# Patient Record
Sex: FEMALE | Race: WHITE | NOT HISPANIC OR LATINO | URBAN - METROPOLITAN AREA
[De-identification: names, ages, dates, MRNs, and addresses within clinical notes are randomized per-mention and may not be internally consistent; named-entity substitution may affect disease eponyms.]

---

## 2017-04-10 PROBLEM — Z00.00 ENCOUNTER FOR PREVENTIVE HEALTH EXAMINATION: Noted: 2017-04-10

## 2017-04-27 ENCOUNTER — OUTPATIENT (OUTPATIENT)
Dept: OUTPATIENT SERVICES | Facility: HOSPITAL | Age: 42
LOS: 1 days | End: 2017-04-27
Payer: COMMERCIAL

## 2017-04-27 ENCOUNTER — APPOINTMENT (OUTPATIENT)
Dept: MAMMOGRAPHY | Facility: CLINIC | Age: 42
End: 2017-04-27

## 2017-04-27 ENCOUNTER — APPOINTMENT (OUTPATIENT)
Dept: ULTRASOUND IMAGING | Facility: CLINIC | Age: 42
End: 2017-04-27

## 2017-04-27 DIAGNOSIS — Z12.39 ENCOUNTER FOR OTHER SCREENING FOR MALIGNANT NEOPLASM OF BREAST: ICD-10-CM

## 2017-04-27 DIAGNOSIS — Z00.8 ENCOUNTER FOR OTHER GENERAL EXAMINATION: ICD-10-CM

## 2017-04-27 PROCEDURE — 77067 SCR MAMMO BI INCL CAD: CPT

## 2017-04-27 PROCEDURE — 77063 BREAST TOMOSYNTHESIS BI: CPT

## 2017-04-27 PROCEDURE — 76641 ULTRASOUND BREAST COMPLETE: CPT

## 2018-05-22 ENCOUNTER — RESULT REVIEW (OUTPATIENT)
Age: 43
End: 2018-05-22

## 2018-06-20 ENCOUNTER — OUTPATIENT (OUTPATIENT)
Dept: OUTPATIENT SERVICES | Facility: HOSPITAL | Age: 43
LOS: 1 days | End: 2018-06-20
Payer: COMMERCIAL

## 2018-06-20 ENCOUNTER — APPOINTMENT (OUTPATIENT)
Dept: ULTRASOUND IMAGING | Facility: CLINIC | Age: 43
End: 2018-06-20
Payer: COMMERCIAL

## 2018-06-20 ENCOUNTER — APPOINTMENT (OUTPATIENT)
Dept: MAMMOGRAPHY | Facility: CLINIC | Age: 43
End: 2018-06-20
Payer: COMMERCIAL

## 2018-06-20 DIAGNOSIS — Z12.39 ENCOUNTER FOR OTHER SCREENING FOR MALIGNANT NEOPLASM OF BREAST: ICD-10-CM

## 2018-06-20 PROCEDURE — 77067 SCR MAMMO BI INCL CAD: CPT | Mod: 26

## 2018-06-20 PROCEDURE — 77067 SCR MAMMO BI INCL CAD: CPT

## 2018-06-20 PROCEDURE — 76641 ULTRASOUND BREAST COMPLETE: CPT | Mod: 26,50

## 2018-06-20 PROCEDURE — 77063 BREAST TOMOSYNTHESIS BI: CPT | Mod: 26

## 2018-06-20 PROCEDURE — 76641 ULTRASOUND BREAST COMPLETE: CPT

## 2018-06-20 PROCEDURE — 77063 BREAST TOMOSYNTHESIS BI: CPT

## 2019-01-03 ENCOUNTER — TRANSCRIPTION ENCOUNTER (OUTPATIENT)
Age: 44
End: 2019-01-03

## 2019-01-03 ENCOUNTER — APPOINTMENT (OUTPATIENT)
Dept: ULTRASOUND IMAGING | Facility: CLINIC | Age: 44
End: 2019-01-03
Payer: COMMERCIAL

## 2019-01-03 ENCOUNTER — OUTPATIENT (OUTPATIENT)
Dept: OUTPATIENT SERVICES | Facility: HOSPITAL | Age: 44
LOS: 1 days | End: 2019-01-03
Payer: COMMERCIAL

## 2019-01-03 DIAGNOSIS — Z00.8 ENCOUNTER FOR OTHER GENERAL EXAMINATION: ICD-10-CM

## 2019-01-03 DIAGNOSIS — N60.19 DIFFUSE CYSTIC MASTOPATHY OF UNSPECIFIED BREAST: ICD-10-CM

## 2019-01-03 PROCEDURE — 76642 ULTRASOUND BREAST LIMITED: CPT | Mod: 26,50

## 2019-01-03 PROCEDURE — 76642 ULTRASOUND BREAST LIMITED: CPT

## 2019-06-07 ENCOUNTER — RESULT REVIEW (OUTPATIENT)
Age: 44
End: 2019-06-07

## 2019-06-21 ENCOUNTER — OUTPATIENT (OUTPATIENT)
Dept: OUTPATIENT SERVICES | Facility: HOSPITAL | Age: 44
LOS: 1 days | End: 2019-06-21
Payer: COMMERCIAL

## 2019-06-21 ENCOUNTER — APPOINTMENT (OUTPATIENT)
Dept: ULTRASOUND IMAGING | Facility: CLINIC | Age: 44
End: 2019-06-21
Payer: COMMERCIAL

## 2019-06-21 ENCOUNTER — APPOINTMENT (OUTPATIENT)
Dept: MAMMOGRAPHY | Facility: CLINIC | Age: 44
End: 2019-06-21
Payer: COMMERCIAL

## 2019-06-21 DIAGNOSIS — Z12.31 ENCOUNTER FOR SCREENING MAMMOGRAM FOR MALIGNANT NEOPLASM OF BREAST: ICD-10-CM

## 2019-06-21 PROCEDURE — 77063 BREAST TOMOSYNTHESIS BI: CPT | Mod: 26

## 2019-06-21 PROCEDURE — 77067 SCR MAMMO BI INCL CAD: CPT | Mod: 26

## 2019-06-21 PROCEDURE — 77063 BREAST TOMOSYNTHESIS BI: CPT

## 2019-06-21 PROCEDURE — 76641 ULTRASOUND BREAST COMPLETE: CPT

## 2019-06-21 PROCEDURE — 77067 SCR MAMMO BI INCL CAD: CPT

## 2019-06-21 PROCEDURE — 76641 ULTRASOUND BREAST COMPLETE: CPT | Mod: 26,50

## 2019-07-23 ENCOUNTER — TRANSCRIPTION ENCOUNTER (OUTPATIENT)
Age: 44
End: 2019-07-23

## 2020-07-06 ENCOUNTER — RESULT REVIEW (OUTPATIENT)
Age: 45
End: 2020-07-06

## 2020-07-24 ENCOUNTER — TRANSCRIPTION ENCOUNTER (OUTPATIENT)
Age: 45
End: 2020-07-24

## 2020-08-26 ENCOUNTER — OUTPATIENT (OUTPATIENT)
Dept: OUTPATIENT SERVICES | Facility: HOSPITAL | Age: 45
LOS: 1 days | End: 2020-08-26
Payer: COMMERCIAL

## 2020-08-26 ENCOUNTER — APPOINTMENT (OUTPATIENT)
Dept: ULTRASOUND IMAGING | Facility: CLINIC | Age: 45
End: 2020-08-26
Payer: COMMERCIAL

## 2020-08-26 ENCOUNTER — APPOINTMENT (OUTPATIENT)
Dept: MAMMOGRAPHY | Facility: CLINIC | Age: 45
End: 2020-08-26
Payer: COMMERCIAL

## 2020-08-26 DIAGNOSIS — Z12.39 ENCOUNTER FOR OTHER SCREENING FOR MALIGNANT NEOPLASM OF BREAST: ICD-10-CM

## 2020-08-26 PROCEDURE — 77063 BREAST TOMOSYNTHESIS BI: CPT

## 2020-08-26 PROCEDURE — 77063 BREAST TOMOSYNTHESIS BI: CPT | Mod: 26

## 2020-08-26 PROCEDURE — 76641 ULTRASOUND BREAST COMPLETE: CPT | Mod: 26,50

## 2020-08-26 PROCEDURE — 77067 SCR MAMMO BI INCL CAD: CPT | Mod: 26

## 2020-08-26 PROCEDURE — 77067 SCR MAMMO BI INCL CAD: CPT

## 2020-08-26 PROCEDURE — 76641 ULTRASOUND BREAST COMPLETE: CPT

## 2021-07-14 ENCOUNTER — RESULT REVIEW (OUTPATIENT)
Age: 46
End: 2021-07-14

## 2021-08-17 ENCOUNTER — OUTPATIENT (OUTPATIENT)
Dept: OUTPATIENT SERVICES | Facility: HOSPITAL | Age: 46
LOS: 1 days | End: 2021-08-17
Payer: COMMERCIAL

## 2021-08-17 ENCOUNTER — APPOINTMENT (OUTPATIENT)
Dept: MAMMOGRAPHY | Facility: CLINIC | Age: 46
End: 2021-08-17
Payer: COMMERCIAL

## 2021-08-17 ENCOUNTER — APPOINTMENT (OUTPATIENT)
Dept: ULTRASOUND IMAGING | Facility: CLINIC | Age: 46
End: 2021-08-17
Payer: COMMERCIAL

## 2021-08-17 DIAGNOSIS — Z00.8 ENCOUNTER FOR OTHER GENERAL EXAMINATION: ICD-10-CM

## 2021-08-17 PROCEDURE — 76641 ULTRASOUND BREAST COMPLETE: CPT | Mod: 26,50

## 2021-08-17 PROCEDURE — 76641 ULTRASOUND BREAST COMPLETE: CPT

## 2021-08-17 PROCEDURE — 77063 BREAST TOMOSYNTHESIS BI: CPT

## 2021-08-17 PROCEDURE — 77063 BREAST TOMOSYNTHESIS BI: CPT | Mod: 26

## 2021-08-17 PROCEDURE — 77067 SCR MAMMO BI INCL CAD: CPT

## 2021-08-17 PROCEDURE — 77067 SCR MAMMO BI INCL CAD: CPT | Mod: 26

## 2021-08-20 ENCOUNTER — APPOINTMENT (OUTPATIENT)
Dept: ULTRASOUND IMAGING | Facility: CLINIC | Age: 46
End: 2021-08-20
Payer: COMMERCIAL

## 2021-08-20 ENCOUNTER — OUTPATIENT (OUTPATIENT)
Dept: OUTPATIENT SERVICES | Facility: HOSPITAL | Age: 46
LOS: 1 days | End: 2021-08-20
Payer: COMMERCIAL

## 2021-08-20 DIAGNOSIS — R92.8 OTHER ABNORMAL AND INCONCLUSIVE FINDINGS ON DIAGNOSTIC IMAGING OF BREAST: ICD-10-CM

## 2021-08-20 PROCEDURE — 76642 ULTRASOUND BREAST LIMITED: CPT | Mod: 26,LT

## 2021-08-20 PROCEDURE — 76642 ULTRASOUND BREAST LIMITED: CPT

## 2021-08-27 ENCOUNTER — APPOINTMENT (OUTPATIENT)
Dept: ULTRASOUND IMAGING | Facility: CLINIC | Age: 46
End: 2021-08-27
Payer: COMMERCIAL

## 2021-08-27 ENCOUNTER — OUTPATIENT (OUTPATIENT)
Dept: OUTPATIENT SERVICES | Facility: HOSPITAL | Age: 46
LOS: 1 days | End: 2021-08-27
Payer: COMMERCIAL

## 2021-08-27 ENCOUNTER — RESULT REVIEW (OUTPATIENT)
Age: 46
End: 2021-08-27

## 2021-08-27 DIAGNOSIS — Z00.8 ENCOUNTER FOR OTHER GENERAL EXAMINATION: ICD-10-CM

## 2021-08-27 DIAGNOSIS — R92.8 OTHER ABNORMAL AND INCONCLUSIVE FINDINGS ON DIAGNOSTIC IMAGING OF BREAST: ICD-10-CM

## 2021-08-27 PROCEDURE — 88305 TISSUE EXAM BY PATHOLOGIST: CPT

## 2021-08-27 PROCEDURE — 77065 DX MAMMO INCL CAD UNI: CPT | Mod: 26,LT

## 2021-08-27 PROCEDURE — 88360 TUMOR IMMUNOHISTOCHEM/MANUAL: CPT

## 2021-08-27 PROCEDURE — 88342 IMHCHEM/IMCYTCHM 1ST ANTB: CPT | Mod: 26,59

## 2021-08-27 PROCEDURE — 19083 BX BREAST 1ST LESION US IMAG: CPT | Mod: LT

## 2021-08-27 PROCEDURE — 19083 BX BREAST 1ST LESION US IMAG: CPT

## 2021-08-27 PROCEDURE — 88341 IMHCHEM/IMCYTCHM EA ADD ANTB: CPT

## 2021-08-27 PROCEDURE — 88305 TISSUE EXAM BY PATHOLOGIST: CPT | Mod: 26

## 2021-08-27 PROCEDURE — 88360 TUMOR IMMUNOHISTOCHEM/MANUAL: CPT | Mod: 26

## 2021-08-27 PROCEDURE — 77065 DX MAMMO INCL CAD UNI: CPT

## 2021-09-07 ENCOUNTER — APPOINTMENT (OUTPATIENT)
Dept: SURGICAL ONCOLOGY | Facility: CLINIC | Age: 46
End: 2021-09-07
Payer: COMMERCIAL

## 2021-09-07 VITALS
HEART RATE: 77 BPM | WEIGHT: 147 LBS | BODY MASS INDEX: 26.05 KG/M2 | HEIGHT: 63 IN | DIASTOLIC BLOOD PRESSURE: 88 MMHG | OXYGEN SATURATION: 96 % | SYSTOLIC BLOOD PRESSURE: 137 MMHG

## 2021-09-07 DIAGNOSIS — Z86.59 PERSONAL HISTORY OF OTHER MENTAL AND BEHAVIORAL DISORDERS: ICD-10-CM

## 2021-09-07 DIAGNOSIS — Z87.891 PERSONAL HISTORY OF NICOTINE DEPENDENCE: ICD-10-CM

## 2021-09-07 DIAGNOSIS — Z81.8 FAMILY HISTORY OF OTHER MENTAL AND BEHAVIORAL DISORDERS: ICD-10-CM

## 2021-09-07 DIAGNOSIS — Z82.49 FAMILY HISTORY OF ISCHEMIC HEART DISEASE AND OTHER DISEASES OF THE CIRCULATORY SYSTEM: ICD-10-CM

## 2021-09-07 DIAGNOSIS — Z78.9 OTHER SPECIFIED HEALTH STATUS: ICD-10-CM

## 2021-09-07 DIAGNOSIS — N95.9 UNSPECIFIED MENOPAUSAL AND PERIMENOPAUSAL DISORDER: ICD-10-CM

## 2021-09-07 DIAGNOSIS — Z83.438 FAMILY HISTORY OF OTHER DISORDER OF LIPOPROTEIN METABOLISM AND OTHER LIPIDEMIA: ICD-10-CM

## 2021-09-07 DIAGNOSIS — Z86.2 PERSONAL HISTORY OF DISEASES OF THE BLOOD AND BLOOD-FORMING ORGANS AND CERTAIN DISORDERS INVOLVING THE IMMUNE MECHANISM: ICD-10-CM

## 2021-09-07 PROCEDURE — 99245 OFF/OP CONSLTJ NEW/EST HI 55: CPT

## 2021-09-15 ENCOUNTER — OUTPATIENT (OUTPATIENT)
Dept: OUTPATIENT SERVICES | Facility: HOSPITAL | Age: 46
LOS: 1 days | End: 2021-09-15
Payer: COMMERCIAL

## 2021-09-15 ENCOUNTER — APPOINTMENT (OUTPATIENT)
Dept: ULTRASOUND IMAGING | Facility: IMAGING CENTER | Age: 46
End: 2021-09-15
Payer: COMMERCIAL

## 2021-09-15 ENCOUNTER — APPOINTMENT (OUTPATIENT)
Dept: MRI IMAGING | Facility: IMAGING CENTER | Age: 46
End: 2021-09-15
Payer: COMMERCIAL

## 2021-09-15 ENCOUNTER — RESULT REVIEW (OUTPATIENT)
Age: 46
End: 2021-09-15

## 2021-09-15 DIAGNOSIS — Z00.8 ENCOUNTER FOR OTHER GENERAL EXAMINATION: ICD-10-CM

## 2021-09-15 DIAGNOSIS — C50.912 MALIGNANT NEOPLASM OF UNSPECIFIED SITE OF LEFT FEMALE BREAST: ICD-10-CM

## 2021-09-15 PROCEDURE — 77049 MRI BREAST C-+ W/CAD BI: CPT | Mod: 26

## 2021-09-15 PROCEDURE — C8908: CPT

## 2021-09-15 PROCEDURE — 76830 TRANSVAGINAL US NON-OB: CPT | Mod: 26

## 2021-09-15 PROCEDURE — 76856 US EXAM PELVIC COMPLETE: CPT | Mod: 26

## 2021-09-15 PROCEDURE — C8937: CPT

## 2021-09-15 PROCEDURE — 76700 US EXAM ABDOM COMPLETE: CPT | Mod: 26

## 2021-09-15 PROCEDURE — 76700 US EXAM ABDOM COMPLETE: CPT

## 2021-09-15 PROCEDURE — 76830 TRANSVAGINAL US NON-OB: CPT

## 2021-09-15 PROCEDURE — 76856 US EXAM PELVIC COMPLETE: CPT

## 2021-09-15 PROCEDURE — A9585: CPT

## 2021-09-17 ENCOUNTER — OUTPATIENT (OUTPATIENT)
Dept: OUTPATIENT SERVICES | Facility: HOSPITAL | Age: 46
LOS: 1 days | End: 2021-09-17
Payer: COMMERCIAL

## 2021-09-17 ENCOUNTER — APPOINTMENT (OUTPATIENT)
Dept: NUCLEAR MEDICINE | Facility: IMAGING CENTER | Age: 46
End: 2021-09-17
Payer: COMMERCIAL

## 2021-09-17 ENCOUNTER — APPOINTMENT (OUTPATIENT)
Dept: RADIOLOGY | Facility: IMAGING CENTER | Age: 46
End: 2021-09-17
Payer: COMMERCIAL

## 2021-09-17 DIAGNOSIS — C50.912 MALIGNANT NEOPLASM OF UNSPECIFIED SITE OF LEFT FEMALE BREAST: ICD-10-CM

## 2021-09-17 DIAGNOSIS — Z00.8 ENCOUNTER FOR OTHER GENERAL EXAMINATION: ICD-10-CM

## 2021-09-17 PROCEDURE — 78306 BONE IMAGING WHOLE BODY: CPT | Mod: 26

## 2021-09-17 PROCEDURE — 71046 X-RAY EXAM CHEST 2 VIEWS: CPT | Mod: 26

## 2021-09-17 PROCEDURE — 71046 X-RAY EXAM CHEST 2 VIEWS: CPT

## 2021-09-17 PROCEDURE — A9561: CPT

## 2021-09-17 PROCEDURE — 78306 BONE IMAGING WHOLE BODY: CPT

## 2021-09-20 ENCOUNTER — TRANSCRIPTION ENCOUNTER (OUTPATIENT)
Age: 46
End: 2021-09-20

## 2021-09-22 ENCOUNTER — APPOINTMENT (OUTPATIENT)
Dept: MRI IMAGING | Facility: CLINIC | Age: 46
End: 2021-09-22

## 2021-09-30 ENCOUNTER — OUTPATIENT (OUTPATIENT)
Dept: OUTPATIENT SERVICES | Facility: HOSPITAL | Age: 46
LOS: 1 days | End: 2021-09-30
Payer: COMMERCIAL

## 2021-09-30 ENCOUNTER — RESULT REVIEW (OUTPATIENT)
Age: 46
End: 2021-09-30

## 2021-09-30 VITALS
HEIGHT: 63 IN | WEIGHT: 148.59 LBS | HEART RATE: 70 BPM | TEMPERATURE: 98 F | SYSTOLIC BLOOD PRESSURE: 120 MMHG | RESPIRATION RATE: 18 BRPM | OXYGEN SATURATION: 98 % | DIASTOLIC BLOOD PRESSURE: 79 MMHG

## 2021-09-30 DIAGNOSIS — C50.912 MALIGNANT NEOPLASM OF UNSPECIFIED SITE OF LEFT FEMALE BREAST: ICD-10-CM

## 2021-09-30 DIAGNOSIS — Z01.818 ENCOUNTER FOR OTHER PREPROCEDURAL EXAMINATION: ICD-10-CM

## 2021-09-30 DIAGNOSIS — F41.9 ANXIETY DISORDER, UNSPECIFIED: ICD-10-CM

## 2021-09-30 DIAGNOSIS — Z98.890 OTHER SPECIFIED POSTPROCEDURAL STATES: Chronic | ICD-10-CM

## 2021-09-30 DIAGNOSIS — N84.0 POLYP OF CORPUS UTERI: Chronic | ICD-10-CM

## 2021-09-30 DIAGNOSIS — Z98.49 CATARACT EXTRACTION STATUS, UNSPECIFIED EYE: Chronic | ICD-10-CM

## 2021-09-30 DIAGNOSIS — K21.9 GASTRO-ESOPHAGEAL REFLUX DISEASE WITHOUT ESOPHAGITIS: ICD-10-CM

## 2021-09-30 LAB
HCG SERPL-ACNC: <1 MIU/ML — SIGNIFICANT CHANGE UP
HCT VFR BLD CALC: 40.6 % — SIGNIFICANT CHANGE UP (ref 34.5–45)
HGB BLD-MCNC: 13.4 G/DL — SIGNIFICANT CHANGE UP (ref 11.5–15.5)
MCHC RBC-ENTMCNC: 31.1 PG — SIGNIFICANT CHANGE UP (ref 27–34)
MCHC RBC-ENTMCNC: 33 GM/DL — SIGNIFICANT CHANGE UP (ref 32–36)
MCV RBC AUTO: 94.2 FL — SIGNIFICANT CHANGE UP (ref 80–100)
NRBC # BLD: 0 /100 WBCS — SIGNIFICANT CHANGE UP (ref 0–0)
PLATELET # BLD AUTO: 281 K/UL — SIGNIFICANT CHANGE UP (ref 150–400)
RBC # BLD: 4.31 M/UL — SIGNIFICANT CHANGE UP (ref 3.8–5.2)
RBC # FLD: 12.2 % — SIGNIFICANT CHANGE UP (ref 10.3–14.5)
SURGICAL PATHOLOGY STUDY: SIGNIFICANT CHANGE UP
WBC # BLD: 10.33 K/UL — SIGNIFICANT CHANGE UP (ref 3.8–10.5)
WBC # FLD AUTO: 10.33 K/UL — SIGNIFICANT CHANGE UP (ref 3.8–10.5)

## 2021-09-30 PROCEDURE — G0463: CPT

## 2021-09-30 PROCEDURE — 85027 COMPLETE CBC AUTOMATED: CPT

## 2021-09-30 PROCEDURE — 36415 COLL VENOUS BLD VENIPUNCTURE: CPT

## 2021-09-30 PROCEDURE — 84702 CHORIONIC GONADOTROPIN TEST: CPT

## 2021-09-30 RX ORDER — SODIUM CHLORIDE 9 MG/ML
1000 INJECTION, SOLUTION INTRAVENOUS
Refills: 0 | Status: DISCONTINUED | OUTPATIENT
Start: 2021-10-06 | End: 2021-10-06

## 2021-09-30 NOTE — H&P PST ADULT - NSICDXFAMILYHX_GEN_ALL_CORE_FT
FAMILY HISTORY:  Father  Still living? Unknown  FH: hypertension, Age at diagnosis: Age Unknown    Mother  Still living? Unknown  FH: hyperlipidemia, Age at diagnosis: Age Unknown  FH: hypertension, Age at diagnosis: Age Unknown

## 2021-09-30 NOTE — H&P PST ADULT - HISTORY OF PRESENT ILLNESS
47 y/o female with PMH of GERD, anxiety, reactive airway disorder, and insomnia presents for PST.  Patient reports abnormal routine breast US in 8/2021.  Since has had additional work up including biopsy, MRI, and repeat US and was diagnoses with left breast malignant neoplasm.  Scheduled for excision left breast cancer w/wire mammographic localization left axillary sentinel lymph node biopsy with Dr San and Dr Martinez on 10/06/2021.  COVID-19 testing scheduled for 10/03/2021 at [facility].

## 2021-09-30 NOTE — H&P PST ADULT - NSICDXPASTMEDICALHX_GEN_ALL_CORE_FT
PAST MEDICAL HISTORY:  Anxiety     GERD (gastroesophageal reflux disease)     Reactive airway disease      PAST MEDICAL HISTORY:  Anxiety     GERD (gastroesophageal reflux disease)     Insomnia     Reactive airway disease

## 2021-09-30 NOTE — H&P PST ADULT - NSICDXPASTSURGICALHX_GEN_ALL_CORE_FT
PAST SURGICAL HISTORY:  H/O endoscopy     S/P cataract surgery     Uterine polyp polypectomy 2014

## 2021-09-30 NOTE — H&P PST ADULT - PROBLEM SELECTOR PLAN 1
Scheduled for excision left breast cancer w/wire mammographic localization, left axillary sentinel lymph node biopsy, injection in OR with Dr San and Dr Martinez on 10/06/2021.  Pre op instructions given and patient verbalized understanding.  CBC, HCG pending.  Chest x ray on chart.  COVID-19 testing scheduled and to follow precautions pre op.  NPO after midnight night before surgery.  May take medications with sip of water Am of procedure.  Chlorhexidine wash given with instructions.  To stop ASA, NSAIDs, vitamins and supplements starting today.

## 2021-09-30 NOTE — H&P PST ADULT - NSANTHOSAYNRD_GEN_A_CORE
neck 13 inches/No. DANNIE screening performed.  STOP BANG Legend: 0-2 = LOW Risk; 3-4 = INTERMEDIATE Risk; 5-8 = HIGH Risk

## 2021-09-30 NOTE — H&P PST ADULT - ATTENDING COMMENTS
46 y.o.  w/ Lt. br. ca., sched'd for wire-loc excis., SLNbx, an oncoplasty: Dr Martinez    D/W her pre-op an day of surgery.    All Q's A'd, consent on chart

## 2021-10-01 ENCOUNTER — APPOINTMENT (OUTPATIENT)
Dept: PLASTIC SURGERY | Facility: CLINIC | Age: 46
End: 2021-10-01
Payer: COMMERCIAL

## 2021-10-01 ENCOUNTER — TRANSCRIPTION ENCOUNTER (OUTPATIENT)
Age: 46
End: 2021-10-01

## 2021-10-01 PROCEDURE — 99244 OFF/OP CNSLTJ NEW/EST MOD 40: CPT

## 2021-10-03 ENCOUNTER — APPOINTMENT (OUTPATIENT)
Dept: DISASTER EMERGENCY | Facility: CLINIC | Age: 46
End: 2021-10-03

## 2021-10-04 LAB — SARS-COV-2 N GENE NPH QL NAA+PROBE: NOT DETECTED

## 2021-10-04 NOTE — CONSULT LETTER
[Dear  ___] : Dear  [unfilled], [Consult Letter:] : I had the pleasure of evaluating your patient, [unfilled]. [Please see my note below.] : Please see my note below. [Consult Closing:] : Thank you very much for allowing me to participate in the care of this patient.  If you have any questions, please do not hesitate to contact me. [Sincerely,] : Sincerely, [FreeTextEntry3] : Vijay Martinez MD, FACS

## 2021-10-05 ENCOUNTER — TRANSCRIPTION ENCOUNTER (OUTPATIENT)
Age: 46
End: 2021-10-05

## 2021-10-05 PROBLEM — F41.9 ANXIETY DISORDER, UNSPECIFIED: Chronic | Status: ACTIVE | Noted: 2021-09-30

## 2021-10-05 PROBLEM — J45.909 UNSPECIFIED ASTHMA, UNCOMPLICATED: Chronic | Status: ACTIVE | Noted: 2021-09-30

## 2021-10-05 PROBLEM — K21.9 GASTRO-ESOPHAGEAL REFLUX DISEASE WITHOUT ESOPHAGITIS: Chronic | Status: ACTIVE | Noted: 2021-09-30

## 2021-10-05 RX ORDER — HEPARIN SODIUM 5000 [USP'U]/ML
5000 INJECTION INTRAVENOUS; SUBCUTANEOUS ONCE
Refills: 0 | Status: COMPLETED | OUTPATIENT
Start: 2021-10-06 | End: 2021-10-06

## 2021-10-05 NOTE — ASU DISCHARGE PLAN (ADULT/PEDIATRIC) - COMMENTS
Please call for an appt if you don't have an appt already.   Dr. Martinez's  direct phone number is [phone]. If after office hours (9-5PM M-F), then please wait until normal business hours to make an appt.  You may leave a detailed VM as well.  You will see Dr. Martinez's GIOVANNI Kendall, (Cindy DAVILA) for your dressing change and first post operative visit.  You may also contact her via email: [email or web address] for any concerns or questions.

## 2021-10-05 NOTE — ASU DISCHARGE PLAN (ADULT/PEDIATRIC) - ACTIVITY LEVEL
No excercise/No heavy lifting/No sports/gym *Don't sleep on your left side./No excercise/No heavy lifting/No sports/gym/No tub baths/No intercourse

## 2021-10-05 NOTE — ASU DISCHARGE PLAN (ADULT/PEDIATRIC) - ASU DC SPECIAL INSTRUCTIONSFT
Initial followup with plastic surgery in the next 5-15 days, regarding matters of bandages, activities, and showering.    Dr. San should call with pathology report in approximately 2 weeks.  The conversation will determine further management. 1. Please follow up with Dr. Martinez's PA, Faiza, next week TUESDAY 10/12/21 for your first post operative visit.   Please call [phone] for an appointment time.    2. Please avoid any strenuous activities, AVOID bending, stretching, reaching overhead, or any heavy lifting. Please keep the bra intact as much as possible, only removing to shower.    3. Some OOZING and blood on the dressing is normal and to be expected. If it becomes saturated w/ BRIGHT red blood that does not stop, please call [phone] for email FAIZA: [email or web address]    4. Your medications were sent to your pharmacy.  Please take the prescribed medications as directed.   For MILD pain please take regular over the counter pain medications such as: Advil, Tylenol, Motrin.   Only take the narcotic prescribed pain medication for SEVERE PAIN.   If constipation occurs after taking narcotic pain medications, please take an over the counter stool softener.    5. Dr. San will call with pathology results in approximately 2 weeks, the conversation will determine further management and follow up.

## 2021-10-05 NOTE — ASU DISCHARGE PLAN (ADULT/PEDIATRIC) - BATHING
you may take a QUICK shower (<10 min) starting tomorrow. Try to minimize your time without the supportive bra in place./Shower only

## 2021-10-06 ENCOUNTER — RESULT REVIEW (OUTPATIENT)
Age: 46
End: 2021-10-06

## 2021-10-06 ENCOUNTER — APPOINTMENT (OUTPATIENT)
Dept: PLASTIC SURGERY | Facility: HOSPITAL | Age: 46
End: 2021-10-06
Payer: COMMERCIAL

## 2021-10-06 ENCOUNTER — APPOINTMENT (OUTPATIENT)
Dept: SURGICAL ONCOLOGY | Facility: HOSPITAL | Age: 46
End: 2021-10-06

## 2021-10-06 ENCOUNTER — OUTPATIENT (OUTPATIENT)
Dept: OUTPATIENT SERVICES | Facility: HOSPITAL | Age: 46
LOS: 1 days | End: 2021-10-06
Payer: COMMERCIAL

## 2021-10-06 VITALS
DIASTOLIC BLOOD PRESSURE: 80 MMHG | HEIGHT: 63 IN | RESPIRATION RATE: 16 BRPM | SYSTOLIC BLOOD PRESSURE: 119 MMHG | HEART RATE: 75 BPM | OXYGEN SATURATION: 97 % | TEMPERATURE: 98 F | WEIGHT: 148.59 LBS

## 2021-10-06 VITALS
RESPIRATION RATE: 16 BRPM | TEMPERATURE: 97 F | OXYGEN SATURATION: 97 % | HEART RATE: 75 BPM | SYSTOLIC BLOOD PRESSURE: 120 MMHG | DIASTOLIC BLOOD PRESSURE: 76 MMHG

## 2021-10-06 DIAGNOSIS — Z98.890 OTHER SPECIFIED POSTPROCEDURAL STATES: Chronic | ICD-10-CM

## 2021-10-06 DIAGNOSIS — C50.912 MALIGNANT NEOPLASM OF UNSPECIFIED SITE OF LEFT FEMALE BREAST: ICD-10-CM

## 2021-10-06 DIAGNOSIS — N84.0 POLYP OF CORPUS UTERI: Chronic | ICD-10-CM

## 2021-10-06 DIAGNOSIS — Z98.49 CATARACT EXTRACTION STATUS, UNSPECIFIED EYE: Chronic | ICD-10-CM

## 2021-10-06 PROCEDURE — 38792 RA TRACER ID OF SENTINL NODE: CPT

## 2021-10-06 PROCEDURE — 88307 TISSUE EXAM BY PATHOLOGIST: CPT | Mod: 26

## 2021-10-06 PROCEDURE — 19281 PERQ DEVICE BREAST 1ST IMAG: CPT

## 2021-10-06 PROCEDURE — 19499 UNLISTED PROCEDURE BREAST: CPT | Mod: AS

## 2021-10-06 PROCEDURE — 13101 CMPLX RPR TRUNK 2.6-7.5 CM: CPT | Mod: 59

## 2021-10-06 PROCEDURE — 88341 IMHCHEM/IMCYTCHM EA ADD ANTB: CPT | Mod: 26

## 2021-10-06 PROCEDURE — 19125 EXCISION BREAST LESION: CPT | Mod: LT

## 2021-10-06 PROCEDURE — 76098 X-RAY EXAM SURGICAL SPECIMEN: CPT | Mod: 26

## 2021-10-06 PROCEDURE — 88307 TISSUE EXAM BY PATHOLOGIST: CPT

## 2021-10-06 PROCEDURE — 38525 BIOPSY/REMOVAL LYMPH NODES: CPT | Mod: LT

## 2021-10-06 PROCEDURE — C1889: CPT

## 2021-10-06 PROCEDURE — 19281 PERQ DEVICE BREAST 1ST IMAG: CPT | Mod: LT

## 2021-10-06 PROCEDURE — 76098 X-RAY EXAM SURGICAL SPECIMEN: CPT

## 2021-10-06 PROCEDURE — 19301 PARTIAL MASTECTOMY: CPT | Mod: LT

## 2021-10-06 PROCEDURE — 38740 REMOVE ARMPIT LYMPH NODES: CPT | Mod: LT

## 2021-10-06 PROCEDURE — 19499 UNLISTED PROCEDURE BREAST: CPT

## 2021-10-06 PROCEDURE — 38900 IO MAP OF SENT LYMPH NODE: CPT | Mod: LT

## 2021-10-06 PROCEDURE — 13101 CMPLX RPR TRUNK 2.6-7.5 CM: CPT | Mod: AS,59

## 2021-10-06 PROCEDURE — 88342 IMHCHEM/IMCYTCHM 1ST ANTB: CPT

## 2021-10-06 PROCEDURE — A9541: CPT

## 2021-10-06 PROCEDURE — 88342 IMHCHEM/IMCYTCHM 1ST ANTB: CPT | Mod: 26

## 2021-10-06 PROCEDURE — 88341 IMHCHEM/IMCYTCHM EA ADD ANTB: CPT

## 2021-10-06 PROCEDURE — 38740 REMOVE ARMPIT LYMPH NODES: CPT | Mod: AS,LT

## 2021-10-06 RX ORDER — ALPRAZOLAM 0.25 MG
1 TABLET ORAL
Qty: 0 | Refills: 0 | DISCHARGE

## 2021-10-06 RX ORDER — OXYCODONE AND ACETAMINOPHEN 5; 325 MG/1; MG/1
1 TABLET ORAL ONCE
Refills: 0 | Status: DISCONTINUED | OUTPATIENT
Start: 2021-10-06 | End: 2021-10-06

## 2021-10-06 RX ORDER — CLONAZEPAM 1 MG
1 TABLET ORAL
Qty: 0 | Refills: 0 | DISCHARGE

## 2021-10-06 RX ORDER — HYDROMORPHONE HYDROCHLORIDE 2 MG/ML
0.5 INJECTION INTRAMUSCULAR; INTRAVENOUS; SUBCUTANEOUS
Refills: 0 | Status: DISCONTINUED | OUTPATIENT
Start: 2021-10-06 | End: 2021-10-06

## 2021-10-06 RX ORDER — SODIUM CHLORIDE 9 MG/ML
1000 INJECTION, SOLUTION INTRAVENOUS
Refills: 0 | Status: DISCONTINUED | OUTPATIENT
Start: 2021-10-06 | End: 2021-10-06

## 2021-10-06 RX ORDER — ALBUTEROL 90 UG/1
2 AEROSOL, METERED ORAL
Qty: 0 | Refills: 0 | DISCHARGE

## 2021-10-06 RX ORDER — ONDANSETRON 8 MG/1
4 TABLET, FILM COATED ORAL ONCE
Refills: 0 | Status: DISCONTINUED | OUTPATIENT
Start: 2021-10-06 | End: 2021-10-06

## 2021-10-06 RX ORDER — LANOLIN ALCOHOL/MO/W.PET/CERES
1 CREAM (GRAM) TOPICAL
Qty: 0 | Refills: 0 | DISCHARGE

## 2021-10-06 RX ORDER — SODIUM CHLORIDE 9 MG/ML
1000 INJECTION, SOLUTION INTRAVENOUS
Refills: 0 | Status: DISCONTINUED | OUTPATIENT
Start: 2021-10-06 | End: 2021-10-21

## 2021-10-06 RX ORDER — IBUPROFEN 200 MG
3 TABLET ORAL
Qty: 0 | Refills: 0 | DISCHARGE

## 2021-10-06 RX ORDER — PANTOPRAZOLE SODIUM 20 MG/1
1 TABLET, DELAYED RELEASE ORAL
Qty: 0 | Refills: 0 | DISCHARGE

## 2021-10-06 RX ADMIN — HEPARIN SODIUM 5000 UNIT(S): 5000 INJECTION INTRAVENOUS; SUBCUTANEOUS at 16:05

## 2021-10-06 RX ADMIN — OXYCODONE AND ACETAMINOPHEN 1 TABLET(S): 5; 325 TABLET ORAL at 18:48

## 2021-10-06 RX ADMIN — OXYCODONE AND ACETAMINOPHEN 1 TABLET(S): 5; 325 TABLET ORAL at 19:15

## 2021-10-06 RX ADMIN — SODIUM CHLORIDE 50 MILLILITER(S): 9 INJECTION, SOLUTION INTRAVENOUS at 13:54

## 2021-10-06 NOTE — ASU PATIENT PROFILE, ADULT - NSICDXPASTMEDICALHX_GEN_ALL_CORE_FT
PAST MEDICAL HISTORY:  Anxiety     GERD (gastroesophageal reflux disease)     Insomnia     Reactive airway disease      PAST MEDICAL HISTORY:  Anxiety     GERD (gastroesophageal reflux disease)     Insomnia occasionally    Reactive airway disease

## 2021-10-06 NOTE — ASU PATIENT PROFILE, ADULT - VISION (WITH CORRECTIVE LENSES IF THE PATIENT USUALLY WEARS THEM):
Partially impaired: cannot see medication labels or newsprint, but can see obstacles in path, and the surrounding layout; can count fingers at arm's length reading glasses/Normal vision: sees adequately in most situations; can see medication labels, newsprint

## 2021-10-06 NOTE — BRIEF OPERATIVE NOTE - OPERATION/FINDINGS
I was present for the entire length of the case, there was no other qualified resident to assist  I assisted with hemostasis, exposure, creating of flaps, closure of wound, and placement of dressings. I was present for the entire length of the case, there was no other qualified resident to assist  I assisted with hemostasis, exposure, creating of flaps, closure of wound, and placement of dressings.  left breast oncoplastic reconstruction  complex closure of left axilla  prineo dermabnd was placed on all incisions

## 2021-10-07 PROBLEM — G47.00 INSOMNIA, UNSPECIFIED: Chronic | Status: ACTIVE | Noted: 2021-09-30

## 2021-10-08 ENCOUNTER — NON-APPOINTMENT (OUTPATIENT)
Age: 46
End: 2021-10-08

## 2021-10-12 ENCOUNTER — APPOINTMENT (OUTPATIENT)
Dept: PLASTIC SURGERY | Facility: CLINIC | Age: 46
End: 2021-10-12
Payer: COMMERCIAL

## 2021-10-12 VITALS — HEIGHT: 63 IN | WEIGHT: 148 LBS | BODY MASS INDEX: 26.22 KG/M2

## 2021-10-12 LAB — SURGICAL PATHOLOGY STUDY: SIGNIFICANT CHANGE UP

## 2021-10-12 PROCEDURE — 99212 OFFICE O/P EST SF 10 MIN: CPT

## 2021-10-18 NOTE — ASSESSMENT
[FreeTextEntry1] : \par \par \par We discussed her recently diagnosed left breast cancer.\par \par For her preoperative assessment I have suggested:\par Breast MRI.\par Chest x-ray.\par Sonogram of the abdomen and pelvis.\par Bone scan.\par \par I introduced the topic of genetic testing.\par Presently she is NOT interested in pursuing this line of investigation\par \par I presented the options of breast conservation versus mastectomy.\par Either approach would include sentinel node biopsy.\par \par Because she is concerned about even take time off from work, and telling people at work about her diagnosis and treatment, the breast conservation therapy approach may minimize her need to be absent from work, and therefore the need to share her personal information with others.\par She understands that she can have completion mastectomies for peace of mind at a time which is more convenient.\par She is a schoolteacher.\par \par Reviewed in detail, all questions answered.\par \par Note dictated\par \par \par 9/29/2021:\par Summary note.\par We spoke.\par I reviewed her preoperative imaging which is all favorable:\par Chest x-ray: Normal.\par Abdominal sonogram: Normal.\par Pelvic ultrasound: Normal.\par Bone scan: No evidence of metastatic disease.\par Breast MRI: BI-RADS 6, no malignancy corresponds to a 4 mm enhancing focus.\par \par Her breast conserving operation is scheduled for October 6\par \par \par 10/18/2021.\par We spoke.\par October 6, 2021, she had left breast conserving surgery.\par Oncoplastic closure: Dr. Vijay Martinez.\par Surgical pathology:\par >6 foci of invasive lobular carcinoma, largest measuring 4.5 mm.\par Negative margins.\par 0/2 SLN's on histology and immunohistochemistry.\par \par Ghazal will submit Oncotype DX.\par \par I have contacted our nurse navigators to arrange for a medical oncology and radiation therapy consultation.\par \par Referring physician contacted through secure email.\par \par My office will call to schedule a postoperative visit.

## 2021-10-18 NOTE — PHYSICAL EXAM
[Normal] : supple, no neck mass and thyroid not enlarged [Normal Neck Lymph Nodes] : normal neck lymph nodes  [Normal Supraclavicular Lymph Nodes] : normal supraclavicular lymph nodes [Normal Axillary Lymph Nodes] : normal axillary lymph nodes [Normal] : normal appearance, no rash, nodules, vesicles, ulcers, erythema [de-identified] : Groins not examined [de-identified] : Below

## 2021-10-18 NOTE — HISTORY OF PRESENT ILLNESS
[de-identified] : 45-year-old lady referred by her gynecologist (Dr. Yvonne Kurtz) with a recently diagnosed LEFT BREAST (5:00) CANCER.\par \par ER/VA +.\par HER-2 negative.\par \par This was an asymptomatic, nonpalpable, abnormality identified on annual breast imaging at [place].\par \par + Prior personal history.\par Bilateral breast cyst aspirations: Benign.\par No other procedures related to either breast.\par \par + FH:\par Paternal grandmother had breast cancer in her mid 40s.\par She also had colorectal cancer.\par \par No other relatives with breast cancer.\par No relatives with ovarian cancer.\par \par + Ashkenazi.\par \par Other relatives with a history of malignancy:\par Paternal grandmother: CRC.\par She also had breast cancer (above).\par \par Maternal grandmother: Glioblastoma multiforme.\par Maternal great grandmother: Multiple myeloma.\par \par \par Tyrer-Cuzick risk score =10.2%.\par \par \par 21:\par Annual, bilateral, mammogram and sonogram at [place]: BI-RADS 0.\par New, left breast (5:00) heterogeneous mass.\par \par 2021:\par Targeted left breast ultrasound at [place]: BI-RADS 4.\par 3 x 4 x 3 mm, slightly vertically oriented, new, nodule.\par Core biopsy recommended.\par \par 2021:\par Core biopsy at [place] provided the above diagnosis.\par \par \par Reproductive history:\par Menarche at 13.\par  2, para 1 at 40.\par She had not having regular menstrual periods due to her Mirena IUD; this was removed today.\par \par \par PMD: Dr. Sinan DE LEON.\par \par No pacemaker or defibrillator.\par No anticoagulants.\par \par Since the diagnosis of breast cancer, she has been prescribed just (below\par \par + GERD, diagnosed by EGD by Dr Justyna TRIVEDI.\par She takes pantoprazole.\par \par \par GYN: Dr. Yvonne Kurtz.\par She had her Mirena IUD removed earlier today.\par \par \par She has not yet had baseline colonoscopy.

## 2021-10-18 NOTE — REASON FOR VISIT
[Initial Consultation] : an initial consultation for [Other: _____] : [unfilled] [FreeTextEntry2] : Recently diagnosed left breast (5:00) breast cancer.

## 2021-10-19 ENCOUNTER — NON-APPOINTMENT (OUTPATIENT)
Age: 46
End: 2021-10-19

## 2021-10-19 ENCOUNTER — APPOINTMENT (OUTPATIENT)
Dept: PLASTIC SURGERY | Facility: CLINIC | Age: 46
End: 2021-10-19
Payer: COMMERCIAL

## 2021-10-19 VITALS
TEMPERATURE: 97.9 F | OXYGEN SATURATION: 97 % | SYSTOLIC BLOOD PRESSURE: 112 MMHG | HEART RATE: 93 BPM | WEIGHT: 148 LBS | DIASTOLIC BLOOD PRESSURE: 74 MMHG | BODY MASS INDEX: 26.22 KG/M2 | HEIGHT: 63 IN

## 2021-10-19 PROCEDURE — 99212 OFFICE O/P EST SF 10 MIN: CPT

## 2021-10-22 ENCOUNTER — RESULT REVIEW (OUTPATIENT)
Age: 46
End: 2021-10-22

## 2021-10-27 ENCOUNTER — TRANSCRIPTION ENCOUNTER (OUTPATIENT)
Age: 46
End: 2021-10-27

## 2021-10-28 ENCOUNTER — TRANSCRIPTION ENCOUNTER (OUTPATIENT)
Age: 46
End: 2021-10-28

## 2021-11-02 ENCOUNTER — OUTPATIENT (OUTPATIENT)
Dept: OUTPATIENT SERVICES | Facility: HOSPITAL | Age: 46
LOS: 1 days | Discharge: HOME | End: 2021-11-02
Payer: COMMERCIAL

## 2021-11-02 ENCOUNTER — APPOINTMENT (OUTPATIENT)
Dept: RADIATION ONCOLOGY | Facility: CLINIC | Age: 46
End: 2021-11-02
Payer: COMMERCIAL

## 2021-11-02 VITALS
SYSTOLIC BLOOD PRESSURE: 125 MMHG | HEART RATE: 79 BPM | RESPIRATION RATE: 17 BRPM | WEIGHT: 149.58 LBS | DIASTOLIC BLOOD PRESSURE: 82 MMHG | OXYGEN SATURATION: 100 % | BODY MASS INDEX: 26.5 KG/M2

## 2021-11-02 DIAGNOSIS — Z98.890 OTHER SPECIFIED POSTPROCEDURAL STATES: Chronic | ICD-10-CM

## 2021-11-02 DIAGNOSIS — N84.0 POLYP OF CORPUS UTERI: Chronic | ICD-10-CM

## 2021-11-02 DIAGNOSIS — Z98.49 CATARACT EXTRACTION STATUS, UNSPECIFIED EYE: Chronic | ICD-10-CM

## 2021-11-02 PROCEDURE — 99204 OFFICE O/P NEW MOD 45 MIN: CPT | Mod: GC,25

## 2021-11-02 PROCEDURE — 77263 THER RADIOLOGY TX PLNG CPLX: CPT

## 2021-11-03 ENCOUNTER — NON-APPOINTMENT (OUTPATIENT)
Age: 46
End: 2021-11-03

## 2021-11-04 ENCOUNTER — APPOINTMENT (OUTPATIENT)
Dept: PLASTIC SURGERY | Facility: CLINIC | Age: 46
End: 2021-11-04
Payer: COMMERCIAL

## 2021-11-04 VITALS
HEIGHT: 63 IN | WEIGHT: 148 LBS | HEART RATE: 85 BPM | TEMPERATURE: 98 F | OXYGEN SATURATION: 98 % | BODY MASS INDEX: 26.22 KG/M2

## 2021-11-04 DIAGNOSIS — R21 RASH AND OTHER NONSPECIFIC SKIN ERUPTION: ICD-10-CM

## 2021-11-04 PROCEDURE — 99212 OFFICE O/P EST SF 10 MIN: CPT

## 2021-11-04 RX ORDER — TRIAMCINOLONE ACETONIDE 0.25 MG/G
0.03 OINTMENT TOPICAL TWICE DAILY
Qty: 1 | Refills: 0 | Status: ACTIVE | COMMUNITY
Start: 2021-11-04 | End: 1900-01-01

## 2021-11-05 ENCOUNTER — OUTPATIENT (OUTPATIENT)
Dept: OUTPATIENT SERVICES | Facility: HOSPITAL | Age: 46
LOS: 1 days | Discharge: HOME | End: 2021-11-05

## 2021-11-05 ENCOUNTER — NON-APPOINTMENT (OUTPATIENT)
Age: 46
End: 2021-11-05

## 2021-11-05 DIAGNOSIS — N84.0 POLYP OF CORPUS UTERI: Chronic | ICD-10-CM

## 2021-11-05 DIAGNOSIS — Z98.49 CATARACT EXTRACTION STATUS, UNSPECIFIED EYE: Chronic | ICD-10-CM

## 2021-11-05 DIAGNOSIS — Z98.890 OTHER SPECIFIED POSTPROCEDURAL STATES: Chronic | ICD-10-CM

## 2021-11-05 DIAGNOSIS — C50.919 MALIGNANT NEOPLASM OF UNSPECIFIED SITE OF UNSPECIFIED FEMALE BREAST: ICD-10-CM

## 2021-11-08 ENCOUNTER — NON-APPOINTMENT (OUTPATIENT)
Age: 46
End: 2021-11-08

## 2021-11-08 ENCOUNTER — APPOINTMENT (OUTPATIENT)
Dept: HEMATOLOGY ONCOLOGY | Facility: CLINIC | Age: 46
End: 2021-11-08
Payer: COMMERCIAL

## 2021-11-08 VITALS
HEART RATE: 86 BPM | RESPIRATION RATE: 16 BRPM | DIASTOLIC BLOOD PRESSURE: 79 MMHG | SYSTOLIC BLOOD PRESSURE: 114 MMHG | HEIGHT: 62.6 IN | OXYGEN SATURATION: 98 % | BODY MASS INDEX: 26.95 KG/M2 | WEIGHT: 150.22 LBS | TEMPERATURE: 98.8 F

## 2021-11-08 DIAGNOSIS — Z80.3 FAMILY HISTORY OF MALIGNANT NEOPLASM OF BREAST: ICD-10-CM

## 2021-11-08 DIAGNOSIS — Z80.8 FAMILY HISTORY OF MALIGNANT NEOPLASM OF OTHER ORGANS OR SYSTEMS: ICD-10-CM

## 2021-11-08 PROCEDURE — 77333 RADIATION TREATMENT AID(S): CPT | Mod: 26

## 2021-11-08 PROCEDURE — 77290 THER RAD SIMULAJ FIELD CPLX: CPT | Mod: 26

## 2021-11-08 PROCEDURE — 99205 OFFICE O/P NEW HI 60 MIN: CPT

## 2021-11-08 RX ORDER — PANTOPRAZOLE SODIUM 40 MG/1
40 TABLET, DELAYED RELEASE ORAL
Refills: 0 | Status: ACTIVE | COMMUNITY

## 2021-11-08 NOTE — CONSULT LETTER
[Dear  ___] : Dear  [unfilled], [Consult Letter:] : I had the pleasure of evaluating your patient, [unfilled]. [Please see my note below.] : Please see my note below. [Consult Closing:] : Thank you very much for allowing me to participate in the care of this patient.  If you have any questions, please do not hesitate to contact me. [Sincerely,] : Sincerely, [DrBoom  ___] : Dr. PABLO [DrBoom ___] : Dr. PABLO [FreeTextEntry2] : Terrell Orosco Beg [FreeTextEntry3] : Otilia Salazar MD\par Division of Medical Oncology and Hematology\par [facility]\par [employer] [facility]\par [place], [place]\par

## 2021-11-08 NOTE — OB HISTORY
[Definite:  ___ (Date)] : the last menstrual period was [unfilled] [___] : Living: [unfilled] [Currently In Menopause] : not currently in menopause

## 2021-11-08 NOTE — REASON FOR VISIT
[Initial Consultation] : an initial consultation [Parent] : parent [FreeTextEntry2] :  invasive moderately-differentiated ductal carcinoma of the left breast.

## 2021-11-08 NOTE — HISTORY OF PRESENT ILLNESS
[N: ___] : N[unfilled] [T: ___] : T[unfilled] [AJCC Stage: ____] : AJCC Stage: [unfilled] [de-identified] : Ms. Macie Beatty is a 46 year old female here for an evaluation of breast cancer. Her oncologic history is as follows:\par \par She underwent routine breast imaging on 8/17/21(BIRADS 0) which showed a new heterogeneous mass of the left breast at 5:00 6 cm FN for which targetd left breast US is recommended.On 8/20/21 (BIRADS 4B) she underwent a left breast US which revealed indeterminate subcentimeter mass at the left 5:00 6 cm FN for which US guided core biopsy is rec, She underwent left breast 5 o'clock 6 cm FN core biopsy on 8/27/21 which showed invasive moderately-differentiated ductal carcinoma with lobular features, Drury score 6/9, measuring 0.5 cm, ER + 90%,OR + 90% HER-2/peterson negative, intermediate grade solid type DCIS with cancerization of lobules. No lymphovascular permeation noted.\par \par She underwent a breast MRI on 9/15/21 (BIRADS 6) which showed 4mm enhancing focus slightly medial anterior to the clip at the site of biopsy proven malignancy in the left breast at the 5:00 position. No significant lymphadenopathy. \par \par She underwent left lower outer quadrant excision with SLNB on 10/6/21 which revealed  multifocal invasive lobular carcinoma, Nabila grade 3,score 6/9, with approximately 6 different foci with measurements ranging from 1.0 to 4.5 mm,  margins were negative, No lymphovascular invasion noted, Lobular carcinoma in situ, classical type, with focal collagenous spherulosis which focally involves an intraductal papilloma noted. Complex sclerosing lesions, Intraductal papillomata, fibrocystic changes, usual ductal hyperplasia, apocrine metaplasia, benign epithelial calcifications and are pseudoangiomatous stromal hyperplasia also noted. No DCIS noted  Two sentinel lymph node were removed and were negative(0/2) for metastasis.\par \par 11/1/21 ONCOTYPE score 13\par \par Opthal: ASHLEY Sahni [place]\par Cataract surgery in 2019, 2020\par \par Dr Yvonne Martin : GYN\par TVS 9/2021 normal

## 2021-11-08 NOTE — PHYSICAL EXAM
[Fully active, able to carry on all pre-disease performance without restriction] : Status 0 - Fully active, able to carry on all pre-disease performance without restriction [Normal] : bilateral breasts without nipple retraction, skin dimpling or palpable masses; the bilateral axillae are without adenopathy [de-identified] : healing left breast scar

## 2021-11-08 NOTE — ASSESSMENT
[FreeTextEntry1] : In summary, Ms. MERYL REYNA is a 46 year old premenopausal female with stage 1A  ( mpT1a,N0(sn) Mn/a) ER positive, [place] positive, HER-2/peterson negative invasive moderately-differentiated carcinoma with lobular features of the left breast. She is s/p lumpectomy on 10/6/21. ODX 13. She has h/o early onset cataracts and uterine polyps.\par \par I discussed treatment for stage I breast cancer with the patient including the role of chemotherapy, radiation therapy and endocrine therapy. Oncotype DX score is low and therefore chemotherapy will have no additional benefit over endocrine therapy alone (TAILORx). She is a candidate for endocrine therapy with tamoxifen x 10 yrs.\par \par I discussed the side effects of tamoxifen including but not limited to hot flashes, mood changes, fluid retention, vaginal dryness. cataracts, thromboembolic events, stroke, cardiovascular side effects and the risk of endometrial cancer. I reviewed that tamoxifen may stop her periods or can cause irregular bleeding. She is done with child bearing. I reviewed teratogenic effects of tamoxifen. She will continue to use non hormonal  barrier contraception to prevent pregnancy. No OCP use. She will followup with Dr Martin every 6-12 months and report any unusual vaginal bleeding or spotting. She has h/o genetic cataracts and underwent surgery in the past. I recommended her to follow up with Dr Murray annually for cataract monitoring and tamoxifen retinopathy We discussed option for Lupron + AI in the event she develops serious toxicity with tamoxifen. Patient is a nonsmoker and has no cardiovascular risk factors. She has no personal or family history of coagulation disorders.  She will continue to follow with her primary care physician for evaluation and management of risk factors for stroke. I educated about signs and symptoms of DVT/PE. Patient will report if she develops acute onset chest pain shortness of breath, leg swelling or calf pain. I recommend aspirin 81 mg for prophylaxis. She understands the thrombotic risk and will hold tamoxifen prior to surgery or prolonged travel.\par \par After understanding the risks and benefits of tamoxifen, patient has decided to start tamoxifen. She will start tamoxifen 2 weeks after completing RT. She will continue annual mammogram. I will see her back in 3-4 months or sooner if she develops any side effects\par \par The patient had plenty of time to ask questions and all of her questions were answered to her satisfaction. I gave her my office phone number and encouraged her to call with any questions or additional information.\par

## 2021-11-09 ENCOUNTER — APPOINTMENT (OUTPATIENT)
Dept: PLASTIC SURGERY | Facility: CLINIC | Age: 46
End: 2021-11-09
Payer: COMMERCIAL

## 2021-11-09 VITALS
HEART RATE: 84 BPM | WEIGHT: 149 LBS | SYSTOLIC BLOOD PRESSURE: 110 MMHG | OXYGEN SATURATION: 97 % | BODY MASS INDEX: 26.4 KG/M2 | TEMPERATURE: 98.2 F | DIASTOLIC BLOOD PRESSURE: 73 MMHG | HEIGHT: 63 IN

## 2021-11-09 PROCEDURE — 99212 OFFICE O/P EST SF 10 MIN: CPT

## 2021-11-09 NOTE — REVIEW OF SYSTEMS
[Anxiety] : anxiety [Negative] : Constitutional [Suicidal] : not suicidal [Depression] : no depression

## 2021-11-09 NOTE — DISEASE MANAGEMENT
[Clinical] : TNM Stage: c [IA] : IA [FreeTextEntry4] : breast cancer [TTNM] : 1b [NTNM] : 0 [MTNM] : X

## 2021-11-09 NOTE — PHYSICAL EXAM
[Supraclavicular Lymph Nodes Enlarged Bilaterally] : supraclavicular [Axillary Lymph Nodes Enlarged Bilaterally] : axillary [Sclera] : the sclera and conjunctiva were normal [Outer Ear] : the ears and nose were normal in appearance [] : no respiratory distress [Heart Rate And Rhythm] : heart rate and rhythm were normal [Breast Abnormal Lactation (Galactorrhea)] : no nipple discharge [No UE Edema] : there is no upper extremity edema [Abdomen Soft] : soft [Nondistended] : nondistended [Normal] : no focal deficits [de-identified] : Left lumpectomy and axillary scars are well healed, no erythema, no hyperpigmentation.  [FreeTextEntry1] : left breast lumpectomy scar well-healed. axillary scar well-healed.\par \par Erythematous rash and excoriations below the incision sites.

## 2021-11-09 NOTE — HISTORY OF PRESENT ILLNESS
[FreeTextEntry1] : Ms. Beatty is a 46 year old woman with a history of cystic breast cysts. She was undergoing routine annual screening mammography. Prior mammography showed no evidence of malignancy and ultrasound showed multiple bilateral cysts. A screening mammogram from 8/17/21 showed no suspicious findings and the ultrasound showed a new heterogeneous mass measuring 3 x 4 x 3 mm in the left breast at 5:00, 6 cm from the nipple. \par \par Ultrasound guided core biopsy of the left breast mass on 8/27/21 showed invasive moderately differentiated ductal carcinoma with lobular features, Nabila score 6/9, measuring at least 0.5 cm. DCIS was also present, with intermediate nuclear grade, solid type. There was no lymphvascular invasion. The invasive component was ER and KS both >90% and HER 2 negative with 0 membrane staining.\par \par MRI breast on 9/8/21 showed no suspicious enhancement in the right breast. There was susceptibility artifact at a biopsy clip in the left breast at the site of biopsy-proven malignancy at the 5:00 axis. There was an adjacent 4 mm enhancing focus slightly medially and anteriorly.\par There was no significant axillary or internal mammary lymphadenopathy. \par \par On 10/6/21, she underwent lumpectomy and sentinel lymph node biopsy. The pathology showed invasive carcinoma with predominantly lobular features. The tumor was multifocal with approximately 6 foci ranging in size from 1.0 to 4.5 mm. There was no lymphvascular invasion. The margins were negative. The closest margin was the deep margin, measuring 1.0 mm. Two left axillary sentinel lymph nodes were negative for tumor. \par \par She reports discomfort and dryness at her surgical site for which she has been using Aquaphor.  She endorses pruritic rashes around the surgical sites.  \par

## 2021-11-15 PROCEDURE — 77300 RADIATION THERAPY DOSE PLAN: CPT | Mod: 26

## 2021-11-15 PROCEDURE — 77295 3-D RADIOTHERAPY PLAN: CPT | Mod: 26

## 2021-11-15 PROCEDURE — 77334 RADIATION TREATMENT AID(S): CPT | Mod: 26

## 2021-11-17 ENCOUNTER — TRANSCRIPTION ENCOUNTER (OUTPATIENT)
Age: 46
End: 2021-11-17

## 2021-11-24 PROCEDURE — 77280 THER RAD SIMULAJ FIELD SMPL: CPT | Mod: 26

## 2021-11-29 PROCEDURE — G6017: CPT

## 2021-11-30 PROCEDURE — G6017: CPT

## 2021-12-01 ENCOUNTER — NON-APPOINTMENT (OUTPATIENT)
Age: 46
End: 2021-12-01

## 2021-12-01 PROCEDURE — G6017: CPT

## 2021-12-01 NOTE — HISTORY OF PRESENT ILLNESS
[FreeTextEntry1] : Ms. Beatty is a 46 year old woman with stage IA [id number](sn)M0 invasive lobular carcinoma of the left breast, ER/SC positive, HER2 negative. She underwent lumpectomy with negative margins and negative sentinel lymph node biopsy.  She is now receiving radiation to the left breast.\par \par She is feeling generally well. She denies fatigue and pain. She has not noted any skin reactions. She has noticed a mild sensitivity. She is using Aquaphor on the skin. \par \par

## 2021-12-01 NOTE — PHYSICAL EXAM
[Normal] : well developed, well nourished, in no acute distress [de-identified] : Left lumpectomy and axillary scars are well healed, no erythema, no hyperpigmentation.

## 2021-12-01 NOTE — DISEASE MANAGEMENT
[Clinical] : TNM Stage: c [IA] : IA [FreeTextEntry4] : breast cancer [TTNM] : 1b [NTNM] : 0 [MTNM] : X [de-identified] : 139vJs [de-identified] : 5240cGy [de-identified] : Left breast

## 2021-12-02 PROCEDURE — G6017: CPT

## 2021-12-03 PROCEDURE — 77427 RADIATION TX MANAGEMENT X5: CPT

## 2021-12-03 PROCEDURE — G6017: CPT

## 2021-12-06 ENCOUNTER — TRANSCRIPTION ENCOUNTER (OUTPATIENT)
Age: 46
End: 2021-12-06

## 2021-12-06 PROCEDURE — G6017: CPT

## 2021-12-07 PROCEDURE — G6017: CPT

## 2021-12-07 NOTE — H&P PST ADULT - CORNEAL ABRASION RISK
Pt would like to cancel procedure. She does not want to reschd.   Pt is afraid of procedure risks.      denies

## 2021-12-08 ENCOUNTER — NON-APPOINTMENT (OUTPATIENT)
Age: 46
End: 2021-12-08

## 2021-12-08 PROCEDURE — G6017: CPT

## 2021-12-08 NOTE — HISTORY OF PRESENT ILLNESS
[FreeTextEntry1] : Ms. Beatty is a 46 year old woman with stage IA [id number](sn)M0 invasive lobular carcinoma of the left breast, ER/[place] positive, HER2 negative. She underwent lumpectomy with negative margins and negative sentinel lymph node biopsy.  She is now receiving radiation to the left breast.\par \par She is feeling generally well. She is having less breast discomfort and swelling this week. She has not noted any significant skin reactions. She has noticed a mild sensitivity of the skin and is using Aquaphor. \par \par

## 2021-12-08 NOTE — DISEASE MANAGEMENT
[Clinical] : TNM Stage: c [IA] : IA [FreeTextEntry4] : breast cancer [TTNM] : 1b [NTNM] : 0 [MTNM] : X [de-identified] : 5542nGy [de-identified] : 5240cGy [de-identified] : Left breast

## 2021-12-08 NOTE — PHYSICAL EXAM
[Normal] : well developed, well nourished, in no acute distress [de-identified] : Left lumpectomy and axillary scars are well healed, mild erythema and hyperpigmentation.

## 2021-12-09 PROCEDURE — G6017: CPT

## 2021-12-10 PROCEDURE — 77427 RADIATION TX MANAGEMENT X5: CPT

## 2021-12-10 PROCEDURE — G6017: CPT

## 2021-12-13 PROCEDURE — G6017: CPT

## 2021-12-14 ENCOUNTER — OUTPATIENT (OUTPATIENT)
Dept: OUTPATIENT SERVICES | Facility: HOSPITAL | Age: 46
LOS: 1 days | Discharge: HOME | End: 2021-12-14

## 2021-12-14 DIAGNOSIS — C50.919 MALIGNANT NEOPLASM OF UNSPECIFIED SITE OF UNSPECIFIED FEMALE BREAST: ICD-10-CM

## 2021-12-14 DIAGNOSIS — Z98.890 OTHER SPECIFIED POSTPROCEDURAL STATES: Chronic | ICD-10-CM

## 2021-12-14 DIAGNOSIS — N84.0 POLYP OF CORPUS UTERI: Chronic | ICD-10-CM

## 2021-12-14 DIAGNOSIS — Z98.49 CATARACT EXTRACTION STATUS, UNSPECIFIED EYE: Chronic | ICD-10-CM

## 2021-12-14 PROCEDURE — G6017: CPT

## 2021-12-15 ENCOUNTER — NON-APPOINTMENT (OUTPATIENT)
Age: 46
End: 2021-12-15

## 2021-12-15 PROCEDURE — 77280 THER RAD SIMULAJ FIELD SMPL: CPT | Mod: 26

## 2021-12-15 PROCEDURE — G6017: CPT

## 2021-12-15 NOTE — VITALS
[Maximal Pain Intensity: 2/10] : 2/10 [Least Pain Intensity: 0/10] : 0/10 [NoTreatment Scheduled] : no treatment scheduled [80: Normal activity with effort; some signs or symptoms of disease.] : 80: Normal activity with effort; some signs or symptoms of disease.

## 2021-12-15 NOTE — DISEASE MANAGEMENT
[Clinical] : TNM Stage: c [IA] : IA [FreeTextEntry4] : breast cancer [TTNM] : 1b [NTNM] : 0 [MTNM] : X [de-identified] : 3441nGy [de-identified] : 5240cGy [de-identified] : Left breast

## 2021-12-15 NOTE — PHYSICAL EXAM
[Normal] : well developed, well nourished, in no acute distress [de-identified] : Left lumpectomy and axillary scars are well healed, mild erythema and hyperpigmentation, mild rash and edema

## 2021-12-15 NOTE — HISTORY OF PRESENT ILLNESS
[FreeTextEntry1] : Ms. Beatty is a 46 year old woman with stage IA [id number](sn)M0 invasive lobular carcinoma of the left breast, ER/[place] positive, HER2 negative. She underwent lumpectomy with negative margins and negative sentinel lymph node biopsy.  She is now receiving radiation to the left breast.\par \par She is feeling generally well. She continues to experience bilateral breast discomfort and swelling this week. She has not noted any significant skin reactions. She has noticed a mild sensitivity of the skin and is using Aquaphor and Triamcinolone to the affected area. Otherwise feeling well and fatigue has improved this week. \par \par

## 2021-12-16 ENCOUNTER — APPOINTMENT (OUTPATIENT)
Dept: HEMATOLOGY ONCOLOGY | Facility: CLINIC | Age: 46
End: 2021-12-16

## 2021-12-16 ENCOUNTER — LABORATORY RESULT (OUTPATIENT)
Age: 46
End: 2021-12-16

## 2021-12-16 PROCEDURE — G6017: CPT

## 2021-12-17 PROCEDURE — 77427 RADIATION TX MANAGEMENT X5: CPT

## 2021-12-17 PROCEDURE — G6017: CPT

## 2021-12-20 PROCEDURE — G6017: CPT

## 2021-12-22 ENCOUNTER — NON-APPOINTMENT (OUTPATIENT)
Age: 46
End: 2021-12-22

## 2021-12-22 RX ORDER — MOMETASONE FUROATE 1 MG/G
0.1 CREAM TOPICAL TWICE DAILY
Qty: 1 | Refills: 0 | Status: ACTIVE | COMMUNITY
Start: 2021-12-22 | End: 1900-01-01

## 2021-12-22 NOTE — DISCUSSION/SUMMARY
[FreeTextEntry1] : \par REASON FOR CONSULT\par Ms. Macie Beatty is a 46-year-old female referred by Dr. Otilia Salazar for cancer genetic counseling and risk assessment due to her personal and family history of breast cancer. Ms. Beatty was seen on 2021, at which time medical, personal, and family history was ascertained and a pedigree constructed. Ms. Beatty was unaccompanied. \par \par RELEVANT MEDICAL HISTORY\par Ms. Beatty was recently diagnosed with left breast cancer. On 21, she underwent a biopsy which showed invasive ductal carcinoma with lobular features and DCIS with cancerization of lobules. The tumor was ER-Positive, ME-Positive, and HER-2/peterson-Negative. Subsequently, on 10/6/2021, she underwent a lumpectomy with SLNB. Pathology revealed multifocal invasive lobular carcinoma and LCIS, with approximately 6 different foci. No lymphovascular invasion noted. No DCIS was noted. Two sentinel lymph nodes were removed and were negative for metastasis. Radiation treatment followed.\par \par OB/GYN HISTORY\par Ms. Beatty reported being age 13 at menarche. She is  2 Para 1 and was age 40 at first childbirth. She reported taking oral contraceptives for 15 years and using IUD for 2 years. Her ovaries and uterus remain intact.    \par \par CANCER SCREENING HISTORY\par Gynecological Screening: \par •	Frequency: Annually; Last: 2021; Results: Neg\par •	Transvaginal ultrasounds: Last: 2021; Results: Neg\par •	Serum CA-125: None  \par \par Colon Screening:\par •	Colonoscopy: None \par •	Alternatives (i.e. Cologuard): None   \par \par Dermatological Screening: \par •	Frequency: Every 3-4 yrs; Results: No suspicious lesions reported\par \par SOCIAL HISTORY\par •	Tobacco-product use: Yes, half-pack/day for 20 yrs; socially past 10 years\par •	Environmental exposures: No \par \par FAMILY HISTORY\par Maternal and paternal ancestry was reported as Citizen of [place] Ashkenazi-[place]. Consanguinity was denied. A detailed family history of cancer was ascertained, see below and scanned chart for pedigree. \par \par GENETIC TESTING IN FAMILY\par Ms. Beatty reported that she is unaware of anyone in her family receiving genetic testing for cancer susceptibility.   \par 	\par RISK ASSESSMENT\par Ms. Beatty’s personal and family history of cancer is suggestive of a hereditary cancer syndrome given her and her paternal grandmother’s breast cancer, and the higher frequency of the hereditary breast-ovarian cancer predisposition syndrome among Ashkenazi-Temple families with breast cancer. The patient meets National Comprehensive Cancer Network (NCCN) criteria for genetic testing. Given that recent advances in genomic technology allow for simultaneous analysis of multiple genes, we discussed the option of testing for a panel of genes currently known to be associated with hereditary cancer.\par \par Ms. Beatty was informed that should her results reveal a pathogenic variant, increased cancer screening and risk-reducing options would be discussed.\par \par It was emphasized to Ms. Beatty that her genetic risk assessment was based upon personal and biological relatives’ medical history as provided. We discussed the importance of cancer pathology and genetic test reports in the assessment of cancer syndromes. Current risk assessment may change in the future should new information be obtained. Therefore, it was recommended that she contact us if any additional information becomes available. \par \par GENETIC TESTING\par Given Ms. Beatty’s personal and family history of cancer, testing for a panel of genes associated with increased risk for breast cancer was recommended. This test analyzes the EARLE, BARD1, BRCA1, BRCA2, BRIP1, CDH1, CHEK2, PALB2, PTEN, RAD51C, RAD51D, STK11, TP53 genes.\par \par The benefits, risks and limitations of genetic testing were discussed with Ms. Beatty. In addition, we discussed the purpose of genetic testing and possible test results (positive, negative, inconclusive) along with associated medical management options. We reviewed genetic implications for family members along with psychosocial factors associated with genetic testing. Insurance coverage and potential out of pocket costs were also discussed. Ms. Beatty was informed about the Genetic Information Non-discrimination Act (BAHMAN) and the potential for genetic discrimination, and she was provided with written information regarding BAHMAN and with a brochure from [employer].\par \par Following the discussion, Ms. Beatty consented to pursue testing. She was taken to our laboratory for a blood draw. Her sample was sent today to [email or web address]. \par \par PLAN:  \par We will contact Ms. Beatty to schedule a follow-up appointment when results are available. Results are expected in 2-3 weeks. Dr. Salazar will also receive a copy of the test results and follow-up note from our service.  Ms. Beatty stated that she understands and agrees with this plan. \par \par Ms. Beatty signed a medical release form to allow discussion of her genetics information with her sister. This will be scanned into her chart.\par \par We remain available to Ms. Beatty for any questions, comments, or concerns.\par \par Uyen Jane MS, [facility] [place]\par Genetic Counselor, cancer Genetics\par \par Attachment: Pedigree\par \par CC/att: Otilia Salazar M.D.\par

## 2021-12-29 ENCOUNTER — NON-APPOINTMENT (OUTPATIENT)
Age: 46
End: 2021-12-29

## 2021-12-29 NOTE — ASU PATIENT PROFILE, ADULT - ACCEPTABLE
[facility] Infectious Disease  Followup Visit        Assessment:     Positive QuantiFERON gold.CXR negative.  While this potentially could be as a result of his status of known latent TB, we could not find confirmation that he was treated.  Even if he was treated given his extensive travel back to [facility] he is at risk of having reinfection therefore would recommend treating him as again latent TB  Diabetes mellitus not controlled A1c of 12.  Discussed that with him.  How this puts him at higher risk for infections including Covid  Recent scleritis.  Doubtful related to his positive QuantiFERON no signs of active tuberculosis.  Symptoms resolved. Negative autoimmune serology. He reports another episode a year ago.  Not Covid vaccinated     Plan:   Start p.o. rifampin 600 mg daily for 4 months  Discussed hepatic side effects  Baseline LFTs okay will repeat in 1 months  Strongly recommended working with his primary doctor and endocrinology regarding control of his diabetes.  He might need insulin.  Strongly recommended Covid vaccination  Follow-up 2 months    Alyssa Robertson M.D.    ADDENDUM 1/1  [facility] reports no treatment records for latent tb            Present Illness:   39 years old male, uncontrolled diabetes mellitus A1c 12 last time checked, on oral hypoglycemics unclear compliance, presenting after referral from ophthalmology when his QuantiFERON gold came back positive.  He reports he was at ophthalmology because of acute onset of redness and pain in both eyes.  He was suspected of having scleritis.  Prescribed some oral medication cannot recall the name and start listed in my chart.  He was seen at [place] [facility] Dr. Blanca.  He is much better now with resolution ophthalmologic signs and symptoms.  QuantiFERON gold again came back positive.  Chest x-ray negative.  No respiratory symptoms no coughing shortness of breath no weight loss no night sweats no hemoptysis.  He reports having positive PPD   20 years ago upon immigration here.  He was started on antituberculous treatment for 9 months presumptively isoniazid.  We could not find confirmation of that [facility] clinic.  He does not recall any history suggestive of tuberculosis in [place] where he immigrated from.  He does note however travel to [facility] about every 2 years where he stays for about 3 months.    Review of Systems  Performed and all negative except as mentioned above.    Past medical, family, and social history reviewed, unchanged from before.        Physical Exam:     General Appearance:  Alert, cooperative, no distress, appears stated age    Head:  Normocephalic, without obvious abnormality, atraumatic   Neck no stiffness or adenopathy  Eyes no conjunctivitis or icterus      Lungs:  No audible wheezing        Heart:  No edema   Abdomen:  Soft, non-distened   Extremities:  Extremities normal, atraumatic, no cyanosis or edema,     Skin:  No rash   Neurologic:  Alert and oriented X 3, Moves all 4 extremities        DATA   No results found for any visits on 12/29/21.  Radiology personally reviewed  CXR    No focal pulmonary infiltrate, pleural effusion, or pneumothorax. The cardiac size and mediastinal contours appear within normal limits. No radiographic features to suggest active tuberculosis.      Ref Range & Units 9 d ago    Quantiferon-TB Gold Plus Negative Positive Abnormal     Comment: Interferon gamma response to M.tuberculosis antigens was detected,suggesting infection with M.tuberculosis. Positive results in patients at low risk for infection should be interpreted with caution and repeat testing on a new sample should be considered as recommended by the 2017 ATS/IDSA/CDC Clinical Prac   sue Guidelines for Diagnosis of Tuberculosis in Adults and Children     TB1 Ag minus Nil Value IU/mL 4.05     TB2 Ag minus Nil Value IU/mL 3.80     Mitogen minus Nil Result IU/mL 9.90     Nil Result IU/mL 0.10    Resulting Agency  [email or web address]               Specimen Collected: 12/20/21 12:30 PM Last Resulted: 12/21/21  3:19 PM        Lab Flowsheet     Order Details     View Encounter     Lab and Collection Details     Routing     Result History             Result Care Coordination            Alyssa Robertson MD, MD       5

## 2022-01-05 ENCOUNTER — NON-APPOINTMENT (OUTPATIENT)
Age: 47
End: 2022-01-05

## 2022-01-05 NOTE — DISEASE MANAGEMENT
[Clinical] : TNM Stage: c [IA] : IA [FreeTextEntry4] : breast cancer [TTNM] : 1b [NTNM] : 0 [MTNM] : X [de-identified] : 4990cGy [de-identified] : 5240cGy [de-identified] : Left breast

## 2022-01-05 NOTE — HISTORY OF PRESENT ILLNESS
[FreeTextEntry1] : Ms. Beatty is a 46 year old woman with stage IA [id number](sn)M0 invasive lobular carcinoma of the left breast, ER/UT positive, HER2 negative. She underwent lumpectomy with negative margins and negative sentinel lymph node biopsy.  She is now receiving radiation to the left breast.\par \par She comes in for an OTV.  She was prescribed mometasone cream last week for worsening rash, itching, and swelling.  The skin has been improving with the steroid cream.  Hyperpigmentation worsening, especially in her left axillary region with some dry peeling of the skin.\par

## 2022-01-05 NOTE — DISCUSSION/SUMMARY
[FreeTextEntry1] : From: Macie Beatty [mailto:[email or web address]] \par Sent: Wednesday, December 22, 2021 7:11 PM\par To: Aiyana Richard N <[email or web address]>\par Subject: Re: [EXTERNAL] Rash\par \par Thank you and thank you for your quick reply.  Otherwise I am very well, thank you for asking. \par \par Happy Holidays!\par Macie\par Sent from my [employer]\par \par \par On Dec 22, 2021, at 7:06 PM, Aiyana Richard <[email or web address]> wrote:\par  \par Let’s try a different steroid cream. I will send it to your pharmacy.\par Maybe try this cream by itself (nothing else) for a while and let me know. \par Hope you are otherwise OK.\par  \par From: Macie Beatty [mailto:[email or web address]] \par Sent: Wednesday, December 22, 2021 7:02 PM\par To: Aiyana Richard <[email or web address]>\par Subject: [EXTERNAL] Rash\par \par Hi Dr Richard,\par \par Sorry to bother you, not sure who I should be reaching out to but the rash on my chest is pretty bad despite me using the steroid cream.  It’s super bumpy and itchy.  I was going to try benedryl cream for the itch but is there anything else I can try or do?\par \par Thank you,\par  \par Macie Beatty\par \par Sent from my [employer]

## 2022-01-05 NOTE — PHYSICAL EXAM
[Normal] : well developed, well nourished, in no acute distress [de-identified] : Left lumpectomy and axillary scars are well healed, moderate erythema and hyperpigmentation, dryness

## 2022-01-05 NOTE — VITALS
[NoTreatment Scheduled] : no treatment scheduled [80: Normal activity with effort; some signs or symptoms of disease.] : 80: Normal activity with effort; some signs or symptoms of disease.  [Maximal Pain Intensity: 6/10] : 6/10 [Least Pain Intensity: 2/10] : 2/10 [Pain Location: ___] : Pain Location: [unfilled] [Pain Interferes with ADLs] : Pain does not interfere with activities of daily living

## 2022-01-06 PROCEDURE — 77427 RADIATION TX MANAGEMENT X5: CPT

## 2022-01-13 ENCOUNTER — NON-APPOINTMENT (OUTPATIENT)
Age: 47
End: 2022-01-13

## 2022-01-19 ENCOUNTER — NON-APPOINTMENT (OUTPATIENT)
Age: 47
End: 2022-01-19

## 2022-02-10 ENCOUNTER — APPOINTMENT (OUTPATIENT)
Dept: RADIATION ONCOLOGY | Facility: CLINIC | Age: 47
End: 2022-02-10
Payer: COMMERCIAL

## 2022-02-10 ENCOUNTER — APPOINTMENT (OUTPATIENT)
Dept: SURGICAL ONCOLOGY | Facility: CLINIC | Age: 47
End: 2022-02-10
Payer: COMMERCIAL

## 2022-02-10 VITALS
HEART RATE: 66 BPM | DIASTOLIC BLOOD PRESSURE: 86 MMHG | HEIGHT: 63 IN | RESPIRATION RATE: 17 BRPM | TEMPERATURE: 97.8 F | SYSTOLIC BLOOD PRESSURE: 134 MMHG | OXYGEN SATURATION: 97 %

## 2022-02-10 PROCEDURE — 99214 OFFICE O/P EST MOD 30 MIN: CPT

## 2022-02-10 PROCEDURE — 99024 POSTOP FOLLOW-UP VISIT: CPT

## 2022-02-10 NOTE — PHYSICAL EXAM
[Normal] : supple, no neck mass and thyroid not enlarged [Normal Neck Lymph Nodes] : normal neck lymph nodes  [Normal Supraclavicular Lymph Nodes] : normal supraclavicular lymph nodes [Normal Axillary Lymph Nodes] : normal axillary lymph nodes [Normal] : normal appearance, no rash, nodules, vesicles, ulcers, erythema [de-identified] : Groins not examined [de-identified] : Below

## 2022-02-10 NOTE — HISTORY OF PRESENT ILLNESS
[de-identified] : 46 year-old lady:\par \par 10/6/2021:\par LEFT BREAST conserving operation with oncoplastic closure (Dr. Vijay BIRMINGHAM).\par Surgical pathology:\par 1.  >6 foci of invasive lobular carcinoma.\par Largest measured 4.5 mm.\par Negative margins.\par 2.  0/2 SLN's.\par \par Postoperatively: Met with medical oncology (Dr. Otilia JENNINGS).\par Plan: Started tamoxifen the third week of 2022\par \par 2022:\par Completed XRT: Dr. Aiyana TERRY.\par \par \par 2021:\par At her index visit with me, she did not want to pursue genetic testing.\par 2021 she met with Ms. Uyen ALAS in our division of medical genetics, and testing was submitted...................\par \par \par 2021:\par She was referred by her gynecologist (Dr. Yvonne Kurtz) with a recently diagnosed LEFT BREAST (5:00) CANCER.\par \par ER/AL +.\par HER-2 negative.\par \par This was an asymptomatic, nonpalpable, abnormality identified on annual breast imaging at [facility].\par \par + Prior personal history.\par Bilateral breast cyst aspirations: Benign.\par No other procedures related to either breast.\par \par + FH:\par Paternal grandmother had breast cancer in her mid 40s.\par She also had colorectal cancer.\par \par No other relatives with breast cancer.\par No relatives with ovarian cancer.\par \par + Ashkenazi.\par \par Other relatives with a history of malignancy:\par Paternal grandmother: CRC.\par She also had breast cancer (above).\par \par Maternal grandmother: Glioblastoma multiforme.\par Maternal great grandmother: Multiple myeloma.\par \par \par Tyrer-Cuzick risk score =10.2%.\par Breast cancer risk score, (calculated postoperatively due to the presence of LCIS associated with her ILC): 56.1%.\par \par \par 8-17-21:\par Annual, bilateral, mammogram and sonogram at [facility] [facility]: BI-RADS 0.\par New, left breast (5:00) heterogeneous mass.\par \par 2021:\par Targeted left breast ultrasound at [facility]: BI-RADS 4.\par 3 x 4 x 3 mm, slightly vertically oriented, new, nodule.\par Core biopsy recommended.\par \par 2021:\par Core biopsy at [facility] [facility] provided the above diagnosis.\par \par \par Reproductive history:\par Menarche at 13.\par  2, para 1 at 40.\par She was not having regular menstrual periods due to her Mirena IUD; this was removed 2021 after the diagnosis of breast cancer..\par \par \par PMD: Dr. Sinan DE LEON.\par \par No pacemaker or defibrillator.\par No anticoagulants.\par 81 mg aspirin initiated by medical oncology along with tamoxifen (below)\par \par + GERD, diagnosed by EGD by Dr Justyna TRIVEDI.\par She takes pantoprazole.\par \par + Tamoxifen\par \par \par GYN: Dr. Yvonne Kurtz.\par She had her Mirena IUD removed 2021.\par \par \par She has not yet had baseline colonoscopy.

## 2022-02-10 NOTE — ASSESSMENT
[FreeTextEntry1] : Today she is asymptomatic with an unremarkable breast exam\par \par \par September 2021:\par Preoperative imaging\par Chest x-ray: Normal.\par Abdominal sonogram: Normal.\par Pelvic ultrasound: Normal.\par Bone scan: No evidence of metastatic disease.\par Breast MRI: BI-RADS 6, no malignancy corresponds to a 4 mm enhancing focus.\par \par \par She should have a left mammogram and sonogram in July 2022 to reestablish a baseline after breast conserving therapy.\par Prescription entered.\par \par She should have periodic surveillance breast MRIs: Breast cancer risk score =56.1%.................... \par \par Reviewed in detail, all questions answered.\par \par We will see her in 6 months,, sooner if needed\par \par

## 2022-02-18 NOTE — HISTORY OF PRESENT ILLNESS
[FreeTextEntry1] : Ms. Beatty is a 46 year old woman with stage IA [id number](sn)M0 invasive lobular carcinoma of the left breast, ER/AR positive, HER2 negative. She underwent lumpectomy with negative margins and negative sentinel lymph node biopsy.  She has now completed radiation to the left breast, a dose of 5,240 cGy completed on 1/6/2022.\par \par She comes today for a post-treatment evaluation. The patient reports feeling generally well.  She is involved in her usual activities.  She has a good appetite and denies dysphagia. She denies cough or shortness of breath.  There has been some improvement in the skin since completing radiation therapy.  She was treated with mometasone steroid cream at the end of her treatment, which was helpful.  She is now taking tamoxifen for 3 weeks, tolerating well.  \par \par

## 2022-02-18 NOTE — DISEASE MANAGEMENT
[Clinical] : TNM Stage: c [IA] : IA [FreeTextEntry4] : breast cancer [TTNM] : 1b [NTNM] : 0 [MTNM] : X [de-identified] : 4990cGy [de-identified] : 5240cGy [de-identified] : Left breast

## 2022-02-18 NOTE — PHYSICAL EXAM
[] : no respiratory distress [Normal] : oriented to person, place and time, the affect was normal, the mood was normal and not anxious [Sclera] : the sclera and conjunctiva were normal [Heart Rate And Rhythm] : heart rate and rhythm were normal [Supraclavicular Lymph Nodes Enlarged Bilaterally] : supraclavicular [Axillary Lymph Nodes Enlarged Bilaterally] : axillary [de-identified] :  Left lumpectomy and axillary scars are well healed, mild residual hyperpigmentation.

## 2022-03-02 ENCOUNTER — NON-APPOINTMENT (OUTPATIENT)
Age: 47
End: 2022-03-02

## 2022-03-08 ENCOUNTER — OUTPATIENT (OUTPATIENT)
Dept: OUTPATIENT SERVICES | Facility: HOSPITAL | Age: 47
LOS: 1 days | Discharge: HOME | End: 2022-03-08

## 2022-03-08 DIAGNOSIS — Z98.49 CATARACT EXTRACTION STATUS, UNSPECIFIED EYE: Chronic | ICD-10-CM

## 2022-03-08 DIAGNOSIS — C50.919 MALIGNANT NEOPLASM OF UNSPECIFIED SITE OF UNSPECIFIED FEMALE BREAST: ICD-10-CM

## 2022-03-08 DIAGNOSIS — Z98.890 OTHER SPECIFIED POSTPROCEDURAL STATES: Chronic | ICD-10-CM

## 2022-03-08 DIAGNOSIS — N84.0 POLYP OF CORPUS UTERI: Chronic | ICD-10-CM

## 2022-03-09 ENCOUNTER — RESULT REVIEW (OUTPATIENT)
Age: 47
End: 2022-03-09

## 2022-03-09 ENCOUNTER — APPOINTMENT (OUTPATIENT)
Dept: HEMATOLOGY ONCOLOGY | Facility: CLINIC | Age: 47
End: 2022-03-09
Payer: COMMERCIAL

## 2022-03-09 VITALS
RESPIRATION RATE: 17 BRPM | DIASTOLIC BLOOD PRESSURE: 85 MMHG | HEART RATE: 81 BPM | OXYGEN SATURATION: 97 % | WEIGHT: 151.99 LBS | TEMPERATURE: 98.1 F | SYSTOLIC BLOOD PRESSURE: 132 MMHG | HEIGHT: 62.99 IN | BODY MASS INDEX: 26.93 KG/M2

## 2022-03-09 LAB
BASOPHILS # BLD AUTO: 0.07 K/UL — SIGNIFICANT CHANGE UP (ref 0–0.2)
BASOPHILS NFR BLD AUTO: 0.8 % — SIGNIFICANT CHANGE UP (ref 0–2)
EOSINOPHIL # BLD AUTO: 0.2 K/UL — SIGNIFICANT CHANGE UP (ref 0–0.5)
EOSINOPHIL NFR BLD AUTO: 2.4 % — SIGNIFICANT CHANGE UP (ref 0–6)
HCT VFR BLD CALC: 38 % — SIGNIFICANT CHANGE UP (ref 34.5–45)
HGB BLD-MCNC: 12.9 G/DL — SIGNIFICANT CHANGE UP (ref 11.5–15.5)
IMM GRANULOCYTES NFR BLD AUTO: 0.2 % — SIGNIFICANT CHANGE UP (ref 0–1.5)
LYMPHOCYTES # BLD AUTO: 2.05 K/UL — SIGNIFICANT CHANGE UP (ref 1–3.3)
LYMPHOCYTES # BLD AUTO: 24.3 % — SIGNIFICANT CHANGE UP (ref 13–44)
MCHC RBC-ENTMCNC: 31.9 PG — SIGNIFICANT CHANGE UP (ref 27–34)
MCHC RBC-ENTMCNC: 33.9 G/DL — SIGNIFICANT CHANGE UP (ref 32–36)
MCV RBC AUTO: 93.8 FL — SIGNIFICANT CHANGE UP (ref 80–100)
MONOCYTES # BLD AUTO: 0.68 K/UL — SIGNIFICANT CHANGE UP (ref 0–0.9)
MONOCYTES NFR BLD AUTO: 8.1 % — SIGNIFICANT CHANGE UP (ref 2–14)
NEUTROPHILS # BLD AUTO: 5.42 K/UL — SIGNIFICANT CHANGE UP (ref 1.8–7.4)
NEUTROPHILS NFR BLD AUTO: 64.2 % — SIGNIFICANT CHANGE UP (ref 43–77)
NRBC # BLD: 0 /100 WBCS — SIGNIFICANT CHANGE UP (ref 0–0)
PLATELET # BLD AUTO: 248 K/UL — SIGNIFICANT CHANGE UP (ref 150–400)
RBC # BLD: 4.05 M/UL — SIGNIFICANT CHANGE UP (ref 3.8–5.2)
RBC # FLD: 11.9 % — SIGNIFICANT CHANGE UP (ref 10.3–14.5)
WBC # BLD: 8.44 K/UL — SIGNIFICANT CHANGE UP (ref 3.8–10.5)
WBC # FLD AUTO: 8.44 K/UL — SIGNIFICANT CHANGE UP (ref 3.8–10.5)

## 2022-03-09 PROCEDURE — 99215 OFFICE O/P EST HI 40 MIN: CPT

## 2022-03-10 ENCOUNTER — NON-APPOINTMENT (OUTPATIENT)
Age: 47
End: 2022-03-10

## 2022-03-10 LAB
25(OH)D3 SERPL-MCNC: 23.8 NG/ML
ALBUMIN SERPL ELPH-MCNC: 4.7 G/DL
ALP BLD-CCNC: 60 U/L
ALT SERPL-CCNC: 11 U/L
ANION GAP SERPL CALC-SCNC: 13 MMOL/L
AST SERPL-CCNC: 17 U/L
BILIRUB SERPL-MCNC: 0.3 MG/DL
BUN SERPL-MCNC: 15 MG/DL
CALCIUM SERPL-MCNC: 9.3 MG/DL
CHLORIDE SERPL-SCNC: 100 MMOL/L
CO2 SERPL-SCNC: 25 MMOL/L
CREAT SERPL-MCNC: 0.73 MG/DL
EGFR: 102 ML/MIN/1.73M2
GLUCOSE SERPL-MCNC: 91 MG/DL
POTASSIUM SERPL-SCNC: 4.4 MMOL/L
PROT SERPL-MCNC: 7.3 G/DL
SODIUM SERPL-SCNC: 138 MMOL/L

## 2022-03-17 ENCOUNTER — APPOINTMENT (OUTPATIENT)
Dept: PLASTIC SURGERY | Facility: CLINIC | Age: 47
End: 2022-03-17
Payer: COMMERCIAL

## 2022-03-17 VITALS
WEIGHT: 150 LBS | TEMPERATURE: 98.1 F | BODY MASS INDEX: 26.58 KG/M2 | OXYGEN SATURATION: 97 % | HEART RATE: 78 BPM | HEIGHT: 63 IN | DIASTOLIC BLOOD PRESSURE: 73 MMHG | SYSTOLIC BLOOD PRESSURE: 109 MMHG

## 2022-03-17 DIAGNOSIS — C50.512 MALIGNANT NEOPLASM OF LOWER-OUTER QUADRANT OF LEFT FEMALE BREAST: ICD-10-CM

## 2022-03-17 DIAGNOSIS — Z17.0 MALIGNANT NEOPLASM OF LOWER-OUTER QUADRANT OF LEFT FEMALE BREAST: ICD-10-CM

## 2022-03-17 DIAGNOSIS — Z92.3 PERSONAL HISTORY OF IRRADIATION: ICD-10-CM

## 2022-03-17 PROCEDURE — 99212 OFFICE O/P EST SF 10 MIN: CPT

## 2022-03-18 NOTE — DISCUSSION/SUMMARY
[FreeTextEntry1] : \par REASON FOR CONSULT\par Ms. Macie Beatty is a 46-year-old female referred by Dr. Otilia Salazar for cancer genetic counseling and risk assessment due to her personal and family history of breast cancer. Ms. Beatty was seen on December 16, 2021, at which time a personal and family history of cancer was obtained, and germline genetic testing was ordered. On December 29, 2021, Ms. Beatty was provided with her genetic test results over the phone.      \par \par TEST RESULTS\par Ms. Beatty underwent germline testing for mutations in the EARLE, BARD1, BRCA1, BRCA2, BRIP1, CDH1, CHEK2, PALB2, PTEN, RAD51C, RAD51D, STK11, TP53 genes through [facility]. \par \par Her results revealed: NEGATIVE\par •	No variants of known clinical significance were reported in any of the genes analyzed\par \par ASSESSMENT AND PLAN\par Ms. Beatty tested negative for pathogenic mutations in known high- and moderate-penetrance cancer susceptibility genes. At this time, the cause of Ms. Beatty’s breast cancer remains unknown. We discussed that although this genetic test results are reassuring; they do not entirely rule out the possibility of hereditary cancer risk. \par \par We reviewed the limitations of negative results: \par 1.	Cancers in the family may be associated with a mutation in the genes tested but Ms. Beatty did not inherit it\par 2.	Ms. Mcclellands cancer may be associated with another cancer susceptibility gene not yet tested or identified \par 3.	Ms. Varghese cancer may be associated with an uncommon abnormality in the genes tested that could not be detected due to limitations in technology\par 4.	Ms. Varghese cancer may have developed randomly, or due to environmental factors\par \par Given Ms. Beatty’s negative genetic test results, her personal history, and current reported family history of cancer, the following screening and management recommendations were discussed:\par \par BREAST:\par Ms. Beatty’s current test results do not change her medical management. Long-term management and surveillance should be based on her on- or post-treatment protocol as recommended by her oncologist.\par \par OTHER:\par In the absence of other indications, Ms. Beatty should practice age-appropriate cancer screening for other organ systems as recommended for the general population.\par \par IMPLICATIONS FOR FAMILY MEMBERS\par Ms. Beatty’s current test results do not exclude the possibility of a germline mutation in her at-risk relatives. Thus, to further clarify the risk for her and her family, individualized genetic counseling by a healthcare professional trained in cancer genetics and, if indicated, consideration of testing for her father and paternal uncle was recommended.  \par \par Due to the potential implications for Ms. Beatty’s relatives’ medical care, we emphasized the importance of sharing this information with her relatives as noted above. If an at-risk relative is unavailable or unwilling to be tested, the option of testing more distant relatives should be discussed. We would be happy to discuss this information with her relatives or refer them to cancer genetics centers in their areas.\par \par To locate a genetic counselor, relatives may refer to the National Society of Genetic Counselors (www.nsgc.org/findageneticcounselor) or to the National Cancer [place] Cancer Genetics Services Directory (www.cancer.gov/cancertopics/genetics/directory) and enter the city, state and country in the "location" section of the search tool. \par \par SUMMARY & PLAN\par See above note for cancer surveillance and management recommendations. \par \par It was recommended that Ms. Beatty contact us every 2-3 years to inquire about relevant advances in cancer genetics.  \par \par The importance of re-contacting us with updates regarding her personal and/or family history of cancer, as well as any updates regarding additional cancer genetic test results performed on her and/or family members, was emphasized. Such updates could possibly change our risk assessment and recommendations. \par \par Ms. Beatty’s test results were emailed to Dr. Otilia Salazar on December 29, 2021.\par \par It was recommended that Ms. Beatty share the information discussed with her relatives as noted in this letter. We would be happy to discuss this information with them.\par \par A copy of Ms. Beatty genetic test results and consult note will be sent to Dr. Otilia Salazar. \par \par Test results were released to Ms. Beatty via [facility] portal. In addition, she is aware that her consult notes are available through her EMR portal.\par \par We remain available to Ms. Beatty for future questions, comments, or concerns.      \par \par Uyen Jane MS, [facility] [place]\par Genetic Counselor, Cancer Genetics\par \par Attachment: Genetic Test Results  \par \par Cc/Att: Otilia Salazar M.D. \par               Ms. Macie Beatty\par

## 2022-03-20 RX ORDER — VENLAFAXINE HYDROCHLORIDE 37.5 MG/1
37.5 CAPSULE, EXTENDED RELEASE ORAL DAILY
Qty: 30 | Refills: 0 | Status: DISCONTINUED | COMMUNITY
Start: 2022-03-02 | End: 2022-03-20

## 2022-03-20 NOTE — PHYSICAL EXAM
[Fully active, able to carry on all pre-disease performance without restriction] : Status 0 - Fully active, able to carry on all pre-disease performance without restriction [Normal] : affect appropriate [de-identified] : healed left breast scar

## 2022-03-20 NOTE — HISTORY OF PRESENT ILLNESS
[T: ___] : T[unfilled] [N: ___] : N[unfilled] [AJCC Stage: ____] : AJCC Stage: [unfilled] [de-identified] : Ms. Macie Beatty is a 46 year old female here for an evaluation of breast cancer. Her oncologic history is as follows:\par \par She underwent routine breast imaging on 8/17/21(BIRADS 0) which showed a new heterogeneous mass of the left breast at 5:00 6 cm FN for which targetd left breast US is recommended.On 8/20/21 (BIRADS 4B) she underwent a left breast US which revealed indeterminate subcentimeter mass at the left 5:00 6 cm FN for which US guided core biopsy is rec, She underwent left breast 5 o'clock 6 cm FN core biopsy on 8/27/21 which showed invasive moderately-differentiated ductal carcinoma with lobular features, Beresford score 6/9, measuring 0.5 cm, ER + 90%,LA + 90% HER-2/peterson negative, intermediate grade solid type DCIS with cancerization of lobules. No lymphovascular permeation noted.\par \par She underwent a breast MRI on 9/15/21 (BIRADS 6) which showed 4mm enhancing focus slightly medial anterior to the clip at the site of biopsy proven malignancy in the left breast at the 5:00 position. No significant lymphadenopathy. \par \par She underwent left lower outer quadrant excision with SLNB on 10/6/21 which revealed  multifocal invasive lobular carcinoma, Nabila grade 3,score 6/9, with approximately 6 different foci with measurements ranging from 1.0 to 4.5 mm,  margins were negative, No lymphovascular invasion noted, Lobular carcinoma in situ, classical type, with focal collagenous spherulosis which focally involves an intraductal papilloma noted. Complex sclerosing lesions, Intraductal papillomata, fibrocystic changes, usual ductal hyperplasia, apocrine metaplasia, benign epithelial calcifications and are pseudoangiomatous stromal hyperplasia also noted. No DCIS noted  Two sentinel lymph node were removed and were negative(0/2) for metastasis.\par \par 11/1/21 ONCOTYPE score 13\par \par Opthal: ASHLEY Sahni [place]\par Cataract surgery in 2019, 2020\par \par Dr Yvonne Martin : GYN\par TVS 9/2021 normal [de-identified] : In summary, Ms. MERYL REYNA is a 46 year old premenopausal female with stage 1A  ( mpT1a,N0(sn) Mn/a) ER positive, MS positive, HER-2/peterson negative invasive moderately-differentiated carcinoma with lobular features of the left breast. She is s/p lumpectomy on 10/6/21. ODX 13. She has h/o early onset cataracts and uterine polyps.\par RT completed 1/7/22, tamoxifen started 1/21/22\par \par She had hot flashes and night sweats and called 3/2/22, effexor was prescribed. \par She reports she is usually not an anxious person. She has noticed mild anxiety with the dx and treatment but worsened last week since she started effexor. Rec to stop effexor\par Hot flashes kept her up all night and that affected her day time performance\par Rec to try gabapentin 300 hs\par She denies mood changes, wt gain, vaginal dryness, SOB, chest pain, leg swelling or clotting issues. Her energy and appetite is good, wt stable. Eating healthy\par LMP date: 2/23/22, rec to use condoms\par GYN - Dr Madrigal due 7/2022\par Breast imaging: with [facility]\par Opthal: annual f/u, Dr Murray\par

## 2022-03-20 NOTE — CONSULT LETTER
[Dear  ___] : Dear  [unfilled], [Consult Letter:] : I had the pleasure of evaluating your patient, [unfilled]. [Please see my note below.] : Please see my note below. [Consult Closing:] : Thank you very much for allowing me to participate in the care of this patient.  If you have any questions, please do not hesitate to contact me. [Sincerely,] : Sincerely, [DrBoom  ___] : Dr. PABLO [DrBoom ___] : Dr. PABLO [FreeTextEntry2] : Terrell Orosco Beg [FreeTextEntry3] : Otilia Salazar MD\par Division of Medical Oncology and Hematology\par [facility] [place]\par [employer] [facility]\par [place], [place]\par

## 2022-03-20 NOTE — ASSESSMENT
[FreeTextEntry1] : In summary, Ms. MERYL REYNA is a 46 year old premenopausal female with stage 1A  ( mpT1a,N0(sn) Mn/a) ER positive, MA positive, HER-2/peterson negative invasive moderately-differentiated carcinoma with lobular features of the left breast. She is s/p lumpectomy on 10/6/21. ODX 13. She has h/o early onset cataracts and uterine polyps. RT completed 1/7/22, tamoxifen started 1/21/22\par \par - Breast ca: She is tolerating tamoxifen with expected side effects. Reports good compliance. Plan to continue tamoxifen for 10 years. Will switch to AI after menopause. GYN and opthal f/u annually due to risk of endometrial ca and cataracts respectively.  Up to date with Breast imaging\par -  Hot flashes: 2/2 tamoxifen.  Advised to wear layers and use fan prn. \par She had hot flashes and night sweats and called 3/2/22, effexor was prescribed. She reports she is usually not an anxious person. She has noticed mild anxiety with the dx and treatment but worsened last weeks since she started effexor. Rec to stop effexor. Hot flashes kept her up all night and that affected her day time performance\par Rec to try gabapentin 300 hs\par - Patient is aware of signs and symptoms of DVT/PE. Patient will report if she develops acute onset chest pain shortness of breath, leg swelling or calf pain. \par - Derm and IM referral per her request\par - Continue asa 81\par RTC 6 m. \par

## 2022-04-04 ENCOUNTER — APPOINTMENT (OUTPATIENT)
Dept: DERMATOLOGY | Facility: CLINIC | Age: 47
End: 2022-04-04
Payer: COMMERCIAL

## 2022-04-04 VITALS — WEIGHT: 150 LBS | BODY MASS INDEX: 26.58 KG/M2 | HEIGHT: 63 IN

## 2022-04-04 DIAGNOSIS — L70.0 ACNE VULGARIS: ICD-10-CM

## 2022-04-04 PROCEDURE — 99204 OFFICE O/P NEW MOD 45 MIN: CPT

## 2022-04-13 ENCOUNTER — APPOINTMENT (OUTPATIENT)
Dept: INTERNAL MEDICINE | Facility: CLINIC | Age: 47
End: 2022-04-13

## 2022-05-03 ENCOUNTER — TRANSCRIPTION ENCOUNTER (OUTPATIENT)
Age: 47
End: 2022-05-03

## 2022-05-12 ENCOUNTER — OUTPATIENT (OUTPATIENT)
Dept: OUTPATIENT SERVICES | Facility: HOSPITAL | Age: 47
LOS: 1 days | Discharge: HOME | End: 2022-05-12

## 2022-05-12 DIAGNOSIS — Z98.890 OTHER SPECIFIED POSTPROCEDURAL STATES: Chronic | ICD-10-CM

## 2022-05-12 DIAGNOSIS — N84.0 POLYP OF CORPUS UTERI: Chronic | ICD-10-CM

## 2022-05-12 DIAGNOSIS — Z98.49 CATARACT EXTRACTION STATUS, UNSPECIFIED EYE: Chronic | ICD-10-CM

## 2022-05-12 DIAGNOSIS — C50.919 MALIGNANT NEOPLASM OF UNSPECIFIED SITE OF UNSPECIFIED FEMALE BREAST: ICD-10-CM

## 2022-05-13 ENCOUNTER — APPOINTMENT (OUTPATIENT)
Dept: HEMATOLOGY ONCOLOGY | Facility: CLINIC | Age: 47
End: 2022-05-13
Payer: COMMERCIAL

## 2022-05-13 VITALS
SYSTOLIC BLOOD PRESSURE: 129 MMHG | HEART RATE: 70 BPM | BODY MASS INDEX: 27.64 KG/M2 | DIASTOLIC BLOOD PRESSURE: 82 MMHG | OXYGEN SATURATION: 97 % | WEIGHT: 156 LBS | RESPIRATION RATE: 16 BRPM | TEMPERATURE: 97.9 F | HEIGHT: 62.99 IN

## 2022-05-13 PROCEDURE — 99215 OFFICE O/P EST HI 40 MIN: CPT

## 2022-05-13 NOTE — ASSESSMENT
[FreeTextEntry1] : In summary, Ms. MERYL REYNA is a 46 year old premenopausal female with stage 1A  ( mpT1a,N0(sn) Mn/a) ER positive, NM positive, HER-2/peterson negative invasive moderately-differentiated carcinoma with lobular features of the left breast. She is s/p lumpectomy on 10/6/21. ODX 13. She has h/o early onset cataracts and uterine polyps. RT completed 1/7/22, tamoxifen started 1/21/22\par \par - Breast ca: She is tolerating tamoxifen with expected side effects. Reports good compliance. Plan to continue tamoxifen for 10 years. Will switch to AI after menopause. GYN and opthal f/u annually due to risk of endometrial ca and cataracts respectively.  Up to date with Breast imaging\par \par 5/2022: she started gabapentin 3/2022 for hot flashes and insomnia which helped a little in the beginning but not anymore. she is not able to sleep, feels tired during the day. gained  4 lbs since last visit\par Rec to increase to 600 mg hs\par She is walking 3-4 miles several times a week, eating healthy. She is frustrated with wt gain\par Rec to see PCP and wt loss clinic. She in interested in wt loss pills.  \par LMP 4/22/22, lighter than usual \par \par -  Hot flashes: 2/2 tamoxifen.  Advised to wear layers and use fan prn. \par She had hot flashes and night sweats and called 3/2/22, effexor was prescribed. She reports she is usually not an anxious person. She has noticed mild anxiety with the dx and treatment but worsened last weeks since she started effexor. Rec to stop effexor. Hot flashes kept her up all night and that affected her day time performance\par Rec to increase gabapentin to 600 hs\par - Patient is aware of signs and symptoms of DVT/PE. Patient will report if she develops acute onset chest pain shortness of breath, leg swelling or calf pain. \par - Derm and IM referral per her request\par - Continue asa 81\par RTC 2 m for close interval f/u ( got flashes, wt gain)\par

## 2022-05-13 NOTE — HISTORY OF PRESENT ILLNESS
[T: ___] : T[unfilled] [N: ___] : N[unfilled] [AJCC Stage: ____] : AJCC Stage: [unfilled] [de-identified] : Ms. Macie Beatty is a 46 year old female here for an evaluation of breast cancer. Her oncologic history is as follows:\par \par She underwent routine breast imaging on 8/17/21(BIRADS 0) which showed a new heterogeneous mass of the left breast at 5:00 6 cm FN for which targetd left breast US is recommended.On 8/20/21 (BIRADS 4B) she underwent a left breast US which revealed indeterminate subcentimeter mass at the left 5:00 6 cm FN for which US guided core biopsy is rec, She underwent left breast 5 o'clock 6 cm FN core biopsy on 8/27/21 which showed invasive moderately-differentiated ductal carcinoma with lobular features, Ute score 6/9, measuring 0.5 cm, ER + 90%,MI + 90% HER-2/peterson negative, intermediate grade solid type DCIS with cancerization of lobules. No lymphovascular permeation noted.\par \par She underwent a breast MRI on 9/15/21 (BIRADS 6) which showed 4mm enhancing focus slightly medial anterior to the clip at the site of biopsy proven malignancy in the left breast at the 5:00 position. No significant lymphadenopathy. \par \par She underwent left lower outer quadrant excision with SLNB on 10/6/21 which revealed  multifocal invasive lobular carcinoma, Nabila grade 3,score 6/9, with approximately 6 different foci with measurements ranging from 1.0 to 4.5 mm,  margins were negative, No lymphovascular invasion noted, Lobular carcinoma in situ, classical type, with focal collagenous spherulosis which focally involves an intraductal papilloma noted. Complex sclerosing lesions, Intraductal papillomata, fibrocystic changes, usual ductal hyperplasia, apocrine metaplasia, benign epithelial calcifications and are pseudoangiomatous stromal hyperplasia also noted. No DCIS noted  Two sentinel lymph node were removed and were negative(0/2) for metastasis.\par \par 11/1/21 ONCOTYPE score 13\par \par Opthal: ASHLEY Sahni [place]\par Cataract surgery in 2019, 2020\par \par Dr Yvonne Martin : GYN\par TVS 9/2021 normal\par \par 3/2022: She had hot flashes and night sweats and called 3/2/22, effexor was prescribed. \par She reports she is usually not an anxious person. She has noticed mild anxiety with the dx and treatment but worsened last week since she started effexor. Rec to stop effexor\par Hot flashes kept her up all night and that affected her day time performance\par Rec to try gabapentin 300 hs\par She denies mood changes, wt gain, vaginal dryness, SOB, chest pain, leg swelling or clotting issues. Her energy and appetite is good, wt stable. Eating healthy [de-identified] : In summary, Ms. MERYL REYNA is a 46 year old premenopausal female with stage 1A  ( mpT1a,N0(sn) Mn/a) ER positive, ND positive, HER-2/peterson negative invasive moderately-differentiated carcinoma with lobular features of the left breast. She is s/p lumpectomy on 10/6/21. ODX 13. She has h/o early onset cataracts and uterine polyps.\par RT completed 1/7/22, tamoxifen started 1/21/22\par \par 5/2022: she started gabapentin 3/2022 which helped a little in the beginning but not anymore. she is not able to sleep, feels tired during the day. gained  4 lbs since last visit\par Rec to increase to 600 mg hs\par She is walking 3-4 miles several times a week, eating healthy. She is frustrated with wt gain\par Rec to see PCP and wt loss clinic. She in interested in wt loss pills.  \par LMP 4/22/22, lighter than usual \par LMP date: 2/23/22, rec to use condoms\par GYN - Dr Madrigal due 7/2022\par Breast imaging: with [facility]\par Opthal: annual f/u, Dr Murray

## 2022-05-13 NOTE — REASON FOR VISIT
[Follow-Up Visit] : a follow-up [FreeTextEntry2] :  invasive moderately-differentiated ductal carcinoma of the left breast.

## 2022-05-13 NOTE — CONSULT LETTER
[Dear  ___] : Dear  [unfilled], [Consult Letter:] : I had the pleasure of evaluating your patient, [unfilled]. [Please see my note below.] : Please see my note below. [Consult Closing:] : Thank you very much for allowing me to participate in the care of this patient.  If you have any questions, please do not hesitate to contact me. [Sincerely,] : Sincerely, [DrBoom  ___] : Dr. PABLO [DrBoom ___] : Dr. PABLO [FreeTextEntry2] : Terrell Orosco Beg [FreeTextEntry3] : Otilia Salazar MD\par Division of Medical Oncology and Hematology\par [facility] [place]\par [employer] [facility]\par [place], [place]\par

## 2022-05-13 NOTE — PHYSICAL EXAM
[Fully active, able to carry on all pre-disease performance without restriction] : Status 0 - Fully active, able to carry on all pre-disease performance without restriction [Normal] : affect appropriate [de-identified] : healed left breast scar

## 2022-06-14 PROBLEM — Z92.3 S/P RADIATION THERAPY: Status: ACTIVE | Noted: 2022-06-14

## 2022-06-14 PROBLEM — C50.512 MALIGNANT NEOPLASM OF LOWER-OUTER QUADRANT OF LEFT BREAST OF FEMALE, ESTROGEN RECEPTOR POSITIVE: Status: ACTIVE | Noted: 2021-11-09

## 2022-06-20 ENCOUNTER — RESULT REVIEW (OUTPATIENT)
Age: 47
End: 2022-06-20

## 2022-06-23 ENCOUNTER — APPOINTMENT (OUTPATIENT)
Dept: BARIATRICS/WEIGHT MGMT | Facility: CLINIC | Age: 47
End: 2022-06-23
Payer: COMMERCIAL

## 2022-06-23 VITALS — WEIGHT: 156 LBS | BODY MASS INDEX: 27.99 KG/M2 | HEIGHT: 62.5 IN

## 2022-06-23 DIAGNOSIS — Z13.0 ENCOUNTER FOR SCREENING FOR OTHER SUSPECTED ENDOCRINE DISORDER: ICD-10-CM

## 2022-06-23 DIAGNOSIS — Z13.29 ENCOUNTER FOR SCREENING FOR OTHER SUSPECTED ENDOCRINE DISORDER: ICD-10-CM

## 2022-06-23 DIAGNOSIS — Z13.1 ENCOUNTER FOR SCREENING FOR DIABETES MELLITUS: ICD-10-CM

## 2022-06-23 DIAGNOSIS — Z13.228 ENCOUNTER FOR SCREENING FOR OTHER SUSPECTED ENDOCRINE DISORDER: ICD-10-CM

## 2022-06-23 DIAGNOSIS — Z01.818 ENCOUNTER FOR OTHER PREPROCEDURAL EXAMINATION: ICD-10-CM

## 2022-06-23 DIAGNOSIS — Z98.890 OTHER SPECIFIED POSTPROCEDURAL STATES: ICD-10-CM

## 2022-06-23 DIAGNOSIS — Z13.220 ENCOUNTER FOR SCREENING FOR LIPOID DISORDERS: ICD-10-CM

## 2022-06-23 PROCEDURE — 99205 OFFICE O/P NEW HI 60 MIN: CPT | Mod: 95

## 2022-06-23 NOTE — HISTORY OF PRESENT ILLNESS
[FreeTextEntry1] : Bariatric surgery history: none \par Obesity co-morbidities: mild HLD, recent breast cancer diagnosis\par Comorbidities improved or resolved: none\par Anti-obesity medications: none\par Obesity medication side effects: none\par \par Ms. MERYL REYNA is a 46 year year old female who presents for evaluation and treatment of overweight BMI >27. \par \par Obesity related co- morbidities: previous breast cancer - started end of January, mild HLD, GERD\par Needs a PCP. \par \par Patient lives - with kid. \par Employment status - teacher.  nutrition educator. "not a fan of [employer]"\par \par Weight History:\par Lowest adult weight: 120's\par Highest adult weight:  156\par \par Has gained 10 pounds over the past year.\par \par Obesity began in adulthood.  Weight gain has occurred with:  After last summer, she was at a healthy and happy weight around ~140s.  clothes aren't fitting properly.  Started to increased slightly over covid. \par \par Past weight loss attempts include:  phendimetrazine - 3 years ago. These have produced a maximum of 10-15 pounds of weight loss.  \par Anti-obesity medications in the past: phendimetrazine - gives her anxiety. lost 15 lbs.  \par \par Reasons for desiring weight loss: fitting clothes better\par Perceived obstacles to losing weight: unsure, on tamoxifen\par \par Sleep: 7 hours "hadn't slept well in 5 months" due to night sweats\par \par Has 2 regular meals a day. \par \par Diet history:\par wakes up at: 6-7am.  \par B: skips.  just a banana sometimes. 100 rancho whole wheat english muffin w laughing cow cheese\par L: 11 am - salad w grilled chicken, some greek grain bowl w quinoa, chicken, veggies.  fruit. \par rare snack - 100 calorie chips, yogurt or fruit\par D: sometimes skips 2/7 if she doesn't have hunger. when she does, chicken/steak/shrimp w brown rice\par \par She meal preps.  \par \par snacks: sometimes\par eating after dinner: yes\par overeating episodes: yes\par \par Sodas/fast food/processed foods: pizza. \par \par Water intake per day: "not a big water drinker"  maybe 32 oz per day. \par diet coke - occasional. really tried to decrease this these days. 5 cans. \par crystal light in water sometimes. \par \par Physical activity:\par Patient enjoys: gym\par Current physical activity: walking 3-4 miles 4 days a week starting next week.  Has some weights at home, isn't doing that anymore. 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

## 2022-06-23 NOTE — ASSESSMENT
[FreeTextEntry1] : 46 y.o F with overweight BMI, mild HLD, GERD recent breast CA diagnosis doing tamoxifen presents for weight management\par \par - order labs minus CBC\par - trial Rybelsus - discussed insurance coverage procedure, alternative metformin\par - discussed she's gained 10 lbs over the last year and most distressing is that her clothes don’t fit and she's feeling uncomfortable, gabapentin, tamoxifen, going through cancer diagnosis all stressors/can cause weight gain and discussed expectations of 5-10% weight loss in 3-6 months with treatment, although cardio activity would be most beneficial as patient is intolerant to stimulants\par - avoid diet soda, cheese/dairy\par - incr water intake to 64 oz per day\par - incr moderate cardio activity\par \par Extensive dietary counseling was provided:\par -discussed calorie reduction options: plant-based whole food diet vs. Dash / Mediterranean w/ calorie reduction\par -discussed the usefulness of calorie counting phone applications\par -provided patient with daily estimated calorie requirements and recommended calorie reduction amount\par -three meal components emphasized: large portion vegetables/fruit, smaller portion protein favoring plant-based, smaller portion high fiber carbohydrates\par -properties of macronutrients were reviewed to help the patient understand why a balanced diet is important\par -discussed the avoidance of red and processed meat, sugar sweetened beverages, refined carbohydrates, high fat dairy\par -advised avoidance of snack products and packaged / processed foods\par -counseled about meal timing and portion: large breakfast, medium lunch, small dinner\par -advised to avoid carbohydrates in evening if possible\par -regular water or seltzer throughout the day\par -for stimulus control, advised to keep unhealthy foods out of the house or out of view\par -recommended abstaining entirely from restaurant / fast food / take-out meals\par \par \par Lifestyle recommendations for weight loss were extensively reviewed\par -aerobic exercise reviewed: moderate intensity versus high intensity exercise - an estimate of daily / weekly time requirements was reviewed\par -emphasized that resistance training in addition to aerobic may provide added benefit\par -emphasized that long term weight loss and maintenance depend upon exercise\par -the need for adequate sleep (6+ hours) was reviewed\par \par f/u 4 weeks\par 
Statement Selected

## 2022-06-23 NOTE — REASON FOR VISIT
[Initial Consultation] : an initial consultation for [Obesity] : obesity [Home] : at home, [unfilled] , at the time of the visit. [Medical Office: ([facility])___] : at the medical office located in

## 2022-06-27 ENCOUNTER — APPOINTMENT (OUTPATIENT)
Dept: DERMATOLOGY | Facility: CLINIC | Age: 47
End: 2022-06-27

## 2022-06-27 PROCEDURE — 99213 OFFICE O/P EST LOW 20 MIN: CPT

## 2022-06-27 RX ORDER — TRETINOIN 0.25 MG/G
0.03 CREAM TOPICAL
Qty: 1 | Refills: 2 | Status: ACTIVE | COMMUNITY
Start: 2022-04-04 | End: 1900-01-01

## 2022-07-05 ENCOUNTER — OUTPATIENT (OUTPATIENT)
Dept: OUTPATIENT SERVICES | Facility: HOSPITAL | Age: 47
LOS: 1 days | Discharge: HOME | End: 2022-07-05

## 2022-07-05 DIAGNOSIS — C50.919 MALIGNANT NEOPLASM OF UNSPECIFIED SITE OF UNSPECIFIED FEMALE BREAST: ICD-10-CM

## 2022-07-05 DIAGNOSIS — N84.0 POLYP OF CORPUS UTERI: Chronic | ICD-10-CM

## 2022-07-05 DIAGNOSIS — Z98.49 CATARACT EXTRACTION STATUS, UNSPECIFIED EYE: Chronic | ICD-10-CM

## 2022-07-05 DIAGNOSIS — Z98.890 OTHER SPECIFIED POSTPROCEDURAL STATES: Chronic | ICD-10-CM

## 2022-07-05 LAB
25(OH)D3 SERPL-MCNC: 39.9 NG/ML
ALBUMIN SERPL ELPH-MCNC: 4.5 G/DL
ALP BLD-CCNC: 50 U/L
ALT SERPL-CCNC: 12 U/L
ANION GAP SERPL CALC-SCNC: 12 MMOL/L
AST SERPL-CCNC: 16 U/L
BILIRUB SERPL-MCNC: 0.5 MG/DL
BUN SERPL-MCNC: 12 MG/DL
CALCIUM SERPL-MCNC: 9.2 MG/DL
CHLORIDE SERPL-SCNC: 104 MMOL/L
CHOLEST SERPL-MCNC: 225 MG/DL
CO2 SERPL-SCNC: 24 MMOL/L
CREAT SERPL-MCNC: 0.84 MG/DL
CRP SERPL HS-MCNC: 0.63 MG/L
EGFR: 86 ML/MIN/1.73M2
ESTIMATED AVERAGE GLUCOSE: 105 MG/DL
FERRITIN SERPL-MCNC: 63 NG/ML
GLUCOSE SERPL-MCNC: 87 MG/DL
HBA1C MFR BLD HPLC: 5.3 %
HDLC SERPL-MCNC: 52 MG/DL
INSULIN P FAST SERPL-ACNC: 12.9 UU/ML
IRON SATN MFR SERPL: 28 %
IRON SERPL-MCNC: 89 UG/DL
LDLC SERPL CALC-MCNC: 137 MG/DL
NONHDLC SERPL-MCNC: 174 MG/DL
POTASSIUM SERPL-SCNC: 4.4 MMOL/L
PROT SERPL-MCNC: 6.8 G/DL
SODIUM SERPL-SCNC: 141 MMOL/L
T3FREE SERPL-MCNC: 2.58 PG/ML
T4 FREE SERPL-MCNC: 1.1 NG/DL
TIBC SERPL-MCNC: 318 UG/DL
TRIGL SERPL-MCNC: 183 MG/DL
TSH SERPL-ACNC: 1.2 UIU/ML
UIBC SERPL-MCNC: 229 UG/DL

## 2022-07-06 ENCOUNTER — APPOINTMENT (OUTPATIENT)
Dept: MAMMOGRAPHY | Facility: CLINIC | Age: 47
End: 2022-07-06

## 2022-07-06 ENCOUNTER — APPOINTMENT (OUTPATIENT)
Dept: ULTRASOUND IMAGING | Facility: CLINIC | Age: 47
End: 2022-07-06

## 2022-07-12 ENCOUNTER — NON-APPOINTMENT (OUTPATIENT)
Age: 47
End: 2022-07-12

## 2022-07-17 ENCOUNTER — NON-APPOINTMENT (OUTPATIENT)
Age: 47
End: 2022-07-17

## 2022-07-18 ENCOUNTER — APPOINTMENT (OUTPATIENT)
Dept: HEMATOLOGY ONCOLOGY | Facility: CLINIC | Age: 47
End: 2022-07-18

## 2022-07-18 VITALS
HEART RATE: 87 BPM | TEMPERATURE: 97.7 F | WEIGHT: 151.99 LBS | BODY MASS INDEX: 26.93 KG/M2 | HEIGHT: 63 IN | SYSTOLIC BLOOD PRESSURE: 118 MMHG | DIASTOLIC BLOOD PRESSURE: 79 MMHG | OXYGEN SATURATION: 98 % | RESPIRATION RATE: 16 BRPM

## 2022-07-18 PROCEDURE — 99214 OFFICE O/P EST MOD 30 MIN: CPT

## 2022-07-18 NOTE — PHYSICAL EXAM
[Fully active, able to carry on all pre-disease performance without restriction] : Status 0 - Fully active, able to carry on all pre-disease performance without restriction [Normal] : affect appropriate [de-identified] : healed left breast scar

## 2022-07-18 NOTE — HISTORY OF PRESENT ILLNESS
[T: ___] : T[unfilled] [N: ___] : N[unfilled] [AJCC Stage: ____] : AJCC Stage: [unfilled] [de-identified] : Ms. Macie Beatty is a 46 year old female here for an evaluation of breast cancer. Her oncologic history is as follows:\par \par She underwent routine breast imaging on 8/17/21(BIRADS 0) which showed a new heterogeneous mass of the left breast at 5:00 6 cm FN for which targetd left breast US is recommended.On 8/20/21 (BIRADS 4B) she underwent a left breast US which revealed indeterminate subcentimeter mass at the left 5:00 6 cm FN for which US guided core biopsy is rec, She underwent left breast 5 o'clock 6 cm FN core biopsy on 8/27/21 which showed invasive moderately-differentiated ductal carcinoma with lobular features, Douglas score 6/9, measuring 0.5 cm, ER + 90%,ID + 90% HER-2/peterson negative, intermediate grade solid type DCIS with cancerization of lobules. No lymphovascular permeation noted.\par \par She underwent a breast MRI on 9/15/21 (BIRADS 6) which showed 4mm enhancing focus slightly medial anterior to the clip at the site of biopsy proven malignancy in the left breast at the 5:00 position. No significant lymphadenopathy. \par \par She underwent left lower outer quadrant excision with SLNB on 10/6/21 which revealed  multifocal invasive lobular carcinoma, Nabila grade 3,score 6/9, with approximately 6 different foci with measurements ranging from 1.0 to 4.5 mm,  margins were negative, No lymphovascular invasion noted, Lobular carcinoma in situ, classical type, with focal collagenous spherulosis which focally involves an intraductal papilloma noted. Complex sclerosing lesions, Intraductal papillomata, fibrocystic changes, usual ductal hyperplasia, apocrine metaplasia, benign epithelial calcifications and are pseudoangiomatous stromal hyperplasia also noted. No DCIS noted  Two sentinel lymph node were removed and were negative(0/2) for metastasis.\par \par 11/1/21 ONCOTYPE score 13\par \par Opthal: ASHLEY Sahni [place]\par Cataract surgery in 2019, 2020\par \par Dr Yvonne Martin : GYN\par TVS 9/2021 normal\par \par 3/2022: She had hot flashes and night sweats and called 3/2/22, effexor was prescribed. \par She reports she is usually not an anxious person. She has noticed mild anxiety with the dx and treatment but worsened last week since she started effexor. Rec to stop effexor\par Hot flashes kept her up all night and that affected her day time performance\par Rec to try gabapentin 300 hs\par She denies mood changes, wt gain, vaginal dryness, SOB, chest pain, leg swelling or clotting issues. Her energy and appetite is good, wt stable. Eating healthy [de-identified] : In summary, Ms. MERYL REYNA is a 46 year old premenopausal female with stage 1A  ( mpT1a,N0(sn) Mn/a) ER positive, ND positive, HER-2/peterson negative invasive moderately-differentiated carcinoma with lobular features of the left breast. She is s/p lumpectomy on 10/6/21. ODX 13. She has h/o early onset cataracts and uterine polyps.\par RT completed 1/7/22, tamoxifen started 1/21/22\par \par She has nausea and GERD, triggered by expresso martinis, getting EGD/Colonoscopy next week. Dr Hudson at [facility]oscopy. She is holding tamoxifen x 1 week prior to procedure \par She started gabapentin 3/2022 for hot flashes, helping. Sleep has improved. She saw Wt loss MD, changed diet/exercise, lost 4 lbs. On  Rybelsus\par LMP 4/22/22, lighter than usual \par GYN - Dr Madrigal saw 7/2022\par Breast imaging: with [facility]\par Opthal: annual f/u, Dr Murray

## 2022-07-18 NOTE — ASSESSMENT
[FreeTextEntry1] : In summary, Ms. MERYL REYNA is a 46 year old premenopausal female with stage 1A  ( mpT1a,N0(sn) Mn/a) ER positive, MN positive, HER-2/peterson negative invasive moderately-differentiated carcinoma with lobular features of the left breast. She is s/p lumpectomy on 10/6/21. ODX 13. She has h/o early onset cataracts and uterine polyps. RT completed 1/7/22, tamoxifen started 1/21/22\par \par - Breast ca: She is tolerating tamoxifen with expected side effects. Reports good compliance. Plan to continue tamoxifen for 10 years. Will switch to AI after menopause. GYN and opthal f/u annually due to risk of endometrial ca and cataracts respectively.  Up to date with Breast imaging\par - Hot flashes: 2/2 tamoxifen.  Advised to wear layers and use fan prn. Continue gabapentin\par - Wt gain: Life style changes d/w her. \par - Patient is aware of signs and symptoms of DVT/PE. Patient will report if she develops acute onset chest pain shortness of breath, leg swelling or calf pain. \par - Derm and IM referral per her request\par - Continue asa 81\par RTC 6 m \par

## 2022-07-18 NOTE — CONSULT LETTER
[Dear  ___] : Dear  [unfilled], [Consult Letter:] : I had the pleasure of evaluating your patient, [unfilled]. [Please see my note below.] : Please see my note below. [Consult Closing:] : Thank you very much for allowing me to participate in the care of this patient.  If you have any questions, please do not hesitate to contact me. [Sincerely,] : Sincerely, [DrBoom  ___] : Dr. PABLO [DrBoom ___] : Dr. PABLO [FreeTextEntry2] : Terrell Orosco Beg [FreeTextEntry3] : Otilia Salazar MD\par Division of Medical Oncology and Hematology\par [facility] [place]\par [employer] [facility]\par [place], [place]\par

## 2022-07-28 ENCOUNTER — TRANSCRIPTION ENCOUNTER (OUTPATIENT)
Age: 47
End: 2022-07-28

## 2022-07-29 ENCOUNTER — TRANSCRIPTION ENCOUNTER (OUTPATIENT)
Age: 47
End: 2022-07-29

## 2022-07-29 RX ORDER — ORAL SEMAGLUTIDE 3 MG/1
3 TABLET ORAL
Qty: 30 | Refills: 0 | Status: DISCONTINUED | COMMUNITY
Start: 2022-06-23 | End: 2022-07-29

## 2022-08-04 RX ORDER — OMEPRAZOLE 40 MG/1
40 CAPSULE, DELAYED RELEASE ORAL
Qty: 90 | Refills: 0 | Status: ACTIVE | COMMUNITY
Start: 2022-07-25

## 2022-08-05 ENCOUNTER — APPOINTMENT (OUTPATIENT)
Dept: SURGICAL ONCOLOGY | Facility: CLINIC | Age: 47
End: 2022-08-05

## 2022-08-05 VITALS
HEART RATE: 67 BPM | WEIGHT: 148.03 LBS | SYSTOLIC BLOOD PRESSURE: 117 MMHG | BODY MASS INDEX: 26.23 KG/M2 | DIASTOLIC BLOOD PRESSURE: 81 MMHG | HEIGHT: 63 IN | TEMPERATURE: 98.7 F | OXYGEN SATURATION: 98 % | RESPIRATION RATE: 15 BRPM

## 2022-08-05 PROCEDURE — 99214 OFFICE O/P EST MOD 30 MIN: CPT

## 2022-08-08 NOTE — REASON FOR VISIT
[Follow-Up Visit] : a follow-up visit for [FreeTextEntry2] : Left breast lumpectomy treated with breast conservation surgery and oncoplastic closure

## 2022-08-08 NOTE — ASSESSMENT
[FreeTextEntry1] : Today she is asymptomatic with an unremarkable breast exam\par \par \par September 2021:\par Preoperative imaging\par Chest x-ray: Normal.\par Abdominal sonogram: Normal.\par Pelvic ultrasound: Normal.\par Bone scan: No evidence of metastatic disease.\par Breast MRI: BI-RADS 6, no malignancy corresponds to a 4 mm enhancing focus.\par \par \par She had been due for a left mammogram in July 2022 to reestablish a baseline after breast cancer therapy, that prescription had been entered at her previous visit, and is scheduled for later this month (August 2022\par \par She should have periodic surveillance breast MRIs: Breast cancer risk score =56.1%.................... \par \par Clinically doing well.\par \par We should see her in another 6 months, sooner if needed\par \par

## 2022-08-08 NOTE — HISTORY OF PRESENT ILLNESS
[de-identified] : 46 year-old lady:\par \par 10/6/2021:\par LEFT BREAST conserving operation with oncoplastic closure (Dr. Vijay BIRMINGHAM).\par Surgical pathology:\par 1.  >6 foci of invasive lobular carcinoma.\par Largest measured 4.5 mm.\par Negative margins.\par 2.  0/2 SLN's.\par \par Postoperatively: Met with medical oncology (Dr. Otilia JENNINGS).\par Plan: Started tamoxifen the third week of 2022\par \par 2022:\par Completed XRT: Dr. Aiyana TERRY.\par \par \par 2021:\par At her index visit with me, she did not want to pursue genetic testing.\par 2021 she met with Ms. Uyen ALAS in our division of medical genetics, and testing was submitted:\par Invitae: NO deleterious mutations\par \par \par 2021:\par She was referred by her gynecologist (Dr. Yvonne Kurtz) with a recently diagnosed LEFT BREAST (5:00) CANCER.\par \par ER/FL +.\par HER-2 negative.\par \par This was an asymptomatic, nonpalpable, abnormality identified on annual breast imaging at [facility].\par \par + Prior personal history.\par Bilateral breast cyst aspirations: Benign.\par No other procedures related to either breast.\par \par + FH:\par Paternal grandmother had breast cancer in her mid 40s.\par She also had colorectal cancer.\par \par No other relatives with breast cancer.\par No relatives with ovarian cancer.\par \par + Ashkenazi.\par \par Other relatives with a history of malignancy:\par Paternal grandmother: CRC.\par She also had breast cancer (above).\par \par Maternal grandmother: Glioblastoma multiforme.\par Maternal great grandmother: Multiple myeloma.\par \par \par Tyrer-Cuzick risk score =10.2%.\par Breast cancer risk score, (calculated postoperatively due to the presence of LCIS associated with her ILC): 56.1%.\par \par \par 21:\par Annual, bilateral, mammogram and sonogram at [facility]: BI-RADS 0.\par New, left breast (5:00) heterogeneous mass.\par \par 2021:\par Targeted left breast ultrasound at [facility]: BI-RADS 4.\par 3 x 4 x 3 mm, slightly vertically oriented, new, nodule.\par Core biopsy recommended.\par \par 2021:\par Core biopsy at [facility] provided the above diagnosis.\par \par \par Reproductive history:\par Menarche at 13.\par  2, para 1 at 40.\par She was not having regular menstrual periods due to her Mirena IUD; this was removed 2021 after the diagnosis of breast cancer..\par \par \par PMD: Dr. Sinan DE LEON.\par She may be changing.\par \par No pacemaker or defibrillator.\par No anticoagulants.\par 81 mg aspirin initiated by medical oncology along with tamoxifen (below)\par \par + GERD, diagnosed by EGD by Dr Justyna TRIVEDI.\par She takes omeprazole, and in the summer  started sucralfate also..\par \par + Tamoxifen\par \par \par GYN: Dr. Yvonne Kurtz.\par She had her Mirena IUD removed 2021.\par \par \par 2022 colonoscopy with Dr. Justyna TRIVEDI: Okay x10 years.

## 2022-08-08 NOTE — PHYSICAL EXAM
[Normal] : supple, no neck mass and thyroid not enlarged [Normal Neck Lymph Nodes] : normal neck lymph nodes  [Normal Supraclavicular Lymph Nodes] : normal supraclavicular lymph nodes [Normal Axillary Lymph Nodes] : normal axillary lymph nodes [Normal] : normal appearance, no rash, nodules, vesicles, ulcers, erythema [de-identified] : Groins not examined [de-identified] : Below

## 2022-08-12 ENCOUNTER — APPOINTMENT (OUTPATIENT)
Dept: BARIATRICS/WEIGHT MGMT | Facility: CLINIC | Age: 47
End: 2022-08-12

## 2022-08-18 ENCOUNTER — APPOINTMENT (OUTPATIENT)
Dept: MAMMOGRAPHY | Facility: CLINIC | Age: 47
End: 2022-08-18

## 2022-08-18 ENCOUNTER — APPOINTMENT (OUTPATIENT)
Dept: ULTRASOUND IMAGING | Facility: CLINIC | Age: 47
End: 2022-08-18

## 2022-08-18 ENCOUNTER — RESULT REVIEW (OUTPATIENT)
Age: 47
End: 2022-08-18

## 2022-08-18 ENCOUNTER — OUTPATIENT (OUTPATIENT)
Dept: OUTPATIENT SERVICES | Facility: HOSPITAL | Age: 47
LOS: 1 days | End: 2022-08-18
Payer: COMMERCIAL

## 2022-08-18 DIAGNOSIS — Z98.49 CATARACT EXTRACTION STATUS, UNSPECIFIED EYE: Chronic | ICD-10-CM

## 2022-08-18 DIAGNOSIS — C50.912 MALIGNANT NEOPLASM OF UNSPECIFIED SITE OF LEFT FEMALE BREAST: ICD-10-CM

## 2022-08-18 DIAGNOSIS — Z98.890 OTHER SPECIFIED POSTPROCEDURAL STATES: Chronic | ICD-10-CM

## 2022-08-18 DIAGNOSIS — N84.0 POLYP OF CORPUS UTERI: Chronic | ICD-10-CM

## 2022-08-18 PROCEDURE — 77066 DX MAMMO INCL CAD BI: CPT

## 2022-08-18 PROCEDURE — G0279: CPT

## 2022-08-18 PROCEDURE — 77066 DX MAMMO INCL CAD BI: CPT | Mod: 26

## 2022-08-18 PROCEDURE — G0279: CPT | Mod: 26

## 2022-08-18 PROCEDURE — 76641 ULTRASOUND BREAST COMPLETE: CPT | Mod: 26,50

## 2022-08-18 PROCEDURE — 76641 ULTRASOUND BREAST COMPLETE: CPT

## 2022-08-26 ENCOUNTER — RX CHANGE (OUTPATIENT)
Age: 47
End: 2022-08-26

## 2022-08-26 RX ORDER — METFORMIN ER 500 MG 500 MG/1
500 TABLET ORAL
Qty: 60 | Refills: 0 | Status: DISCONTINUED | COMMUNITY
Start: 2022-07-29 | End: 2022-08-26

## 2022-09-28 ENCOUNTER — APPOINTMENT (OUTPATIENT)
Dept: DERMATOLOGY | Facility: CLINIC | Age: 47
End: 2022-09-28

## 2022-09-28 PROCEDURE — 99214 OFFICE O/P EST MOD 30 MIN: CPT

## 2022-09-28 RX ORDER — TRETINOIN 0.5 MG/G
0.05 CREAM TOPICAL
Qty: 1 | Refills: 1 | Status: ACTIVE | COMMUNITY
Start: 2022-09-28 | End: 1900-01-01

## 2022-10-19 ENCOUNTER — NON-APPOINTMENT (OUTPATIENT)
Age: 47
End: 2022-10-19

## 2022-11-02 ENCOUNTER — TRANSCRIPTION ENCOUNTER (OUTPATIENT)
Age: 47
End: 2022-11-02

## 2023-01-03 ENCOUNTER — RX RENEWAL (OUTPATIENT)
Age: 48
End: 2023-01-03

## 2023-01-12 ENCOUNTER — OUTPATIENT (OUTPATIENT)
Dept: OUTPATIENT SERVICES | Facility: HOSPITAL | Age: 48
LOS: 1 days | Discharge: HOME | End: 2023-01-12

## 2023-01-12 DIAGNOSIS — N84.0 POLYP OF CORPUS UTERI: Chronic | ICD-10-CM

## 2023-01-12 DIAGNOSIS — C50.919 MALIGNANT NEOPLASM OF UNSPECIFIED SITE OF UNSPECIFIED FEMALE BREAST: ICD-10-CM

## 2023-01-12 DIAGNOSIS — Z98.49 CATARACT EXTRACTION STATUS, UNSPECIFIED EYE: Chronic | ICD-10-CM

## 2023-01-12 DIAGNOSIS — Z98.890 OTHER SPECIFIED POSTPROCEDURAL STATES: Chronic | ICD-10-CM

## 2023-01-18 ENCOUNTER — RESULT REVIEW (OUTPATIENT)
Age: 48
End: 2023-01-18

## 2023-01-18 ENCOUNTER — APPOINTMENT (OUTPATIENT)
Dept: HEMATOLOGY ONCOLOGY | Facility: CLINIC | Age: 48
End: 2023-01-18
Payer: COMMERCIAL

## 2023-01-18 VITALS
DIASTOLIC BLOOD PRESSURE: 83 MMHG | OXYGEN SATURATION: 97 % | TEMPERATURE: 98.1 F | HEART RATE: 85 BPM | RESPIRATION RATE: 16 BRPM | SYSTOLIC BLOOD PRESSURE: 126 MMHG

## 2023-01-18 LAB
BASOPHILS # BLD AUTO: 0.05 K/UL — SIGNIFICANT CHANGE UP (ref 0–0.2)
BASOPHILS NFR BLD AUTO: 0.6 % — SIGNIFICANT CHANGE UP (ref 0–2)
EOSINOPHIL # BLD AUTO: 0.21 K/UL — SIGNIFICANT CHANGE UP (ref 0–0.5)
EOSINOPHIL NFR BLD AUTO: 2.6 % — SIGNIFICANT CHANGE UP (ref 0–6)
HCT VFR BLD CALC: 38.1 % — SIGNIFICANT CHANGE UP (ref 34.5–45)
HGB BLD-MCNC: 12.6 G/DL — SIGNIFICANT CHANGE UP (ref 11.5–15.5)
IMM GRANULOCYTES NFR BLD AUTO: 0.2 % — SIGNIFICANT CHANGE UP (ref 0–0.9)
LYMPHOCYTES # BLD AUTO: 2.33 K/UL — SIGNIFICANT CHANGE UP (ref 1–3.3)
LYMPHOCYTES # BLD AUTO: 29.1 % — SIGNIFICANT CHANGE UP (ref 13–44)
MCHC RBC-ENTMCNC: 30.7 PG — SIGNIFICANT CHANGE UP (ref 27–34)
MCHC RBC-ENTMCNC: 33.1 G/DL — SIGNIFICANT CHANGE UP (ref 32–36)
MCV RBC AUTO: 92.7 FL — SIGNIFICANT CHANGE UP (ref 80–100)
MONOCYTES # BLD AUTO: 0.51 K/UL — SIGNIFICANT CHANGE UP (ref 0–0.9)
MONOCYTES NFR BLD AUTO: 6.4 % — SIGNIFICANT CHANGE UP (ref 2–14)
NEUTROPHILS # BLD AUTO: 4.89 K/UL — SIGNIFICANT CHANGE UP (ref 1.8–7.4)
NEUTROPHILS NFR BLD AUTO: 61.1 % — SIGNIFICANT CHANGE UP (ref 43–77)
NRBC # BLD: 0 /100 WBCS — SIGNIFICANT CHANGE UP (ref 0–0)
PLATELET # BLD AUTO: 256 K/UL — SIGNIFICANT CHANGE UP (ref 150–400)
RBC # BLD: 4.11 M/UL — SIGNIFICANT CHANGE UP (ref 3.8–5.2)
RBC # FLD: 12.1 % — SIGNIFICANT CHANGE UP (ref 10.3–14.5)
WBC # BLD: 8.01 K/UL — SIGNIFICANT CHANGE UP (ref 3.8–10.5)
WBC # FLD AUTO: 8.01 K/UL — SIGNIFICANT CHANGE UP (ref 3.8–10.5)

## 2023-01-18 PROCEDURE — 99214 OFFICE O/P EST MOD 30 MIN: CPT

## 2023-01-18 NOTE — PHYSICAL EXAM
[Fully active, able to carry on all pre-disease performance without restriction] : Status 0 - Fully active, able to carry on all pre-disease performance without restriction [Normal] : affect appropriate [de-identified] : healed left breast scar

## 2023-01-18 NOTE — CONSULT LETTER
[Dear  ___] : Dear  [unfilled], [Consult Letter:] : I had the pleasure of evaluating your patient, [unfilled]. [Please see my note below.] : Please see my note below. [Consult Closing:] : Thank you very much for allowing me to participate in the care of this patient.  If you have any questions, please do not hesitate to contact me. [Sincerely,] : Sincerely, [DrBoom  ___] : Dr. PABLO [DrBoom ___] : Dr. PABLO [FreeTextEntry2] : Terrell Orosco Beg [FreeTextEntry3] : Otilia Salazar MD\par Division of Medical Oncology and Hematology\par [facility]\par [employer] [facility]\par [place], [place]\par

## 2023-01-18 NOTE — ASSESSMENT
[FreeTextEntry1] : In summary, Ms. MERYL REYNA is a 46 year old premenopausal female with stage 1A  ( mpT1a,N0(sn) Mn/a) ER positive, [place] positive, HER-2/peterson negative invasive moderately-differentiated carcinoma with lobular features of the left breast. She is s/p lumpectomy on 10/6/21. ODX 13. She has h/o early onset cataracts and uterine polyps. RT completed 1/7/22, tamoxifen started 1/21/22\par \par - Breast ca: She is tolerating tamoxifen with expected side effects. Reports good compliance. Plan to continue tamoxifen for 10 years. Will switch to AI after menopause. GYN and opthal f/u annually due to risk of endometrial ca and cataracts respectively.  Up to date with Breast imaging\par - Hot flashes: 2/2 tamoxifen.  Advised to wear layers and use fan prn. Continue gabapentin\par - Wt gain: Life style changes d/w her. \par - Patient is aware of signs and symptoms of DVT/PE. Patient will report if she develops acute onset chest pain shortness of breath, leg swelling or calf pain. \par - Continue asa 81\par - She took Rybelsus x 10 days 6/2022, had severe GI s/e. Stopped Rybelsus. S/p EGD/colonoscopy/CT- all good. Sx resolved. \par - She has insomnia, melatonin didn’t help. CBD gummies and Mag ok to try . Consider sleep study eval. \par RTC 6 m \par

## 2023-01-18 NOTE — HISTORY OF PRESENT ILLNESS
[T: ___] : T[unfilled] [N: ___] : N[unfilled] [AJCC Stage: ____] : AJCC Stage: [unfilled] [de-identified] : Ms. Macie Beatty is a 46 year old female here for an evaluation of breast cancer. Her oncologic history is as follows:\par \par She underwent routine breast imaging on 8/17/21(BIRADS 0) which showed a new heterogeneous mass of the left breast at 5:00 6 cm FN for which targetd left breast US is recommended.On 8/20/21 (BIRADS 4B) she underwent a left breast US which revealed indeterminate subcentimeter mass at the left 5:00 6 cm FN for which US guided core biopsy is rec, She underwent left breast 5 o'clock 6 cm FN core biopsy on 8/27/21 which showed invasive moderately-differentiated ductal carcinoma with lobular features, [place] score 6/9, measuring 0.5 cm, ER + 90%,[place] + 90% HER-2/peterson negative, intermediate grade solid type DCIS with cancerization of lobules. No lymphovascular permeation noted.\par \par She underwent a breast MRI on 9/15/21 (BIRADS 6) which showed 4mm enhancing focus slightly medial anterior to the clip at the site of biopsy proven malignancy in the left breast at the 5:00 position. No significant lymphadenopathy. \par \par She underwent left lower outer quadrant excision with SLNB on 10/6/21 which revealed  multifocal invasive lobular carcinoma, Nabila grade 3,score 6/9, with approximately 6 different foci with measurements ranging from 1.0 to 4.5 mm,  margins were negative, No lymphovascular invasion noted, Lobular carcinoma in situ, classical type, with focal collagenous spherulosis which focally involves an intraductal papilloma noted. Complex sclerosing lesions, Intraductal papillomata, fibrocystic changes, usual ductal hyperplasia, apocrine metaplasia, benign epithelial calcifications and are pseudoangiomatous stromal hyperplasia also noted. No DCIS noted  Two sentinel lymph node were removed and were negative(0/2) for metastasis.\par \par 11/1/21 ONCOTYPE score 13\par \par Opthal: ASHLEY Sahni [place]\par Cataract surgery in 2019, 2020\par \par Dr Yvonne Martin : GYN\par TVS 9/2021 normal\par \par 3/2022: She had hot flashes and night sweats and called 3/2/22, effexor was prescribed. \par She reports she is usually not an anxious person. She has noticed mild anxiety with the dx and treatment but worsened last week since she started effexor. Rec to stop effexor\par Hot flashes kept her up all night and that affected her day time performance\par Rec to try gabapentin 300 hs\par She denies mood changes, wt gain, vaginal dryness, SOB, chest pain, leg swelling or clotting issues. Her energy and appetite is good, wt stable. Eating healthy [de-identified] : In summary, Ms. MERYL REYNA is a 46 year old premenopausal female with stage 1A  ( mpT1a,N0(sn) Mn/a) ER positive, [place] positive, HER-2/peterson negative invasive moderately-differentiated carcinoma with lobular features of the left breast. She is s/p lumpectomy on 10/6/21. ODX 13. She has h/o early onset cataracts and uterine polyps. RT completed 1/7/22, tamoxifen started 1/21/22\par \par She took Rybelsus x 10 days 6/2022, had severe GI s/e. Stopped Rybelsus. S/p EGD/colonoscopy/CT- all good. Sx resolved. \par She started gabapentin 300 hs 3/2022 for hot flashes, helping. \par She has insomnia, melatonin didn’t help. CBD gummies and Mag ok to try . Consider sleep study eval. \par She is taking tamoxifen with good compliance. She denies mood changes, wt gain, vaginal dryness, SOB, chest pain, leg swelling or clotting issues. Her energy and appetite is good, wt stable. \par LMP date: 11/2022, periods irregular since she started tamoxifen. \par GYN - Dr Madrigal saw 7/2022\par Breast imaging: with Kamlesh, 8/2022\par Opthal: annual f/u, Dr Murray

## 2023-01-19 ENCOUNTER — APPOINTMENT (OUTPATIENT)
Dept: BARIATRICS/WEIGHT MGMT | Facility: CLINIC | Age: 48
End: 2023-01-19
Payer: COMMERCIAL

## 2023-01-19 VITALS — WEIGHT: 152 LBS | BODY MASS INDEX: 26.93 KG/M2

## 2023-01-19 DIAGNOSIS — K21.9 GASTRO-ESOPHAGEAL REFLUX DISEASE W/OUT ESOPHAGITIS: ICD-10-CM

## 2023-01-19 LAB
ALBUMIN SERPL ELPH-MCNC: 4.5 G/DL
ALP BLD-CCNC: 63 U/L
ALT SERPL-CCNC: 9 U/L
ANION GAP SERPL CALC-SCNC: 12 MMOL/L
AST SERPL-CCNC: 16 U/L
BILIRUB SERPL-MCNC: 0.2 MG/DL
BUN SERPL-MCNC: 11 MG/DL
CALCIUM SERPL-MCNC: 9.5 MG/DL
CHLORIDE SERPL-SCNC: 100 MMOL/L
CO2 SERPL-SCNC: 28 MMOL/L
CREAT SERPL-MCNC: 0.9 MG/DL
EGFR: 79 ML/MIN/1.73M2
GLUCOSE SERPL-MCNC: 99 MG/DL
POTASSIUM SERPL-SCNC: 4.3 MMOL/L
PROT SERPL-MCNC: 7.1 G/DL
SODIUM SERPL-SCNC: 140 MMOL/L

## 2023-01-19 PROCEDURE — 99214 OFFICE O/P EST MOD 30 MIN: CPT | Mod: 95

## 2023-01-19 RX ORDER — METFORMIN ER 500 MG 500 MG/1
500 TABLET ORAL
Qty: 180 | Refills: 0 | Status: DISCONTINUED | COMMUNITY
Start: 2022-08-26 | End: 2023-01-19

## 2023-01-19 NOTE — REASON FOR VISIT
[Home] : at home, [unfilled] , at the time of the visit. [Medical Office: ([facility])___] : at the medical office located in  [Obesity] : obesity [Follow-Up] : a follow-up visit [Follow-Up Visit] : a follow-up visit for

## 2023-01-19 NOTE — HISTORY OF PRESENT ILLNESS
[FreeTextEntry1] : Bariatric surgery history: none \par Obesity co-morbidities: mild HLD, recent breast cancer diagnosis\par Comorbidities improved or resolved: none\par Anti-obesity medications: none\par Obesity medication side effects: none\par \par Ms. MERYL REYNA is a 46 year year old female who presents for evaluation and treatment of overweight BMI >27. \par \par Obesity related co- morbidities: previous breast cancer - started end of January, mild HLD, GERD\par Needs a PCP. \par \par Patient lives - with 1 child\par Employment status - teacher.  nutrition educator. "not a fan of [employer]"\par \par Weight History:\par Lowest adult weight: 120's\par Highest adult weight:  156\par \par Interim:\par - had significant GI symptoms from Rybelsus, d/c'd\par - significant gi symptoms from metformin as well\par - went to the oncologist yesterday - has to be on tamoxifen for at least 5 years\par - isn't sleeping well overall due to hot flashes\par - doesn't buy cheese, hardly ever eats, doesn't drink milk\par - has had increased cravings\par - weight has been stable, avoiding weight regain\par - doesn't have time to work out, gets 8k steps at work\par \par Has gained 10 pounds over the past year.\par \par Obesity began in adulthood.  Weight gain has occurred with:  After last summer, she was at a healthy and happy weight around ~140s.  clothes aren't fitting properly.  Started to increased slightly over covid. \par \par Past weight loss attempts include:  phendimetrazine - 3 years ago. These have produced a maximum of 10-15 pounds of weight loss.  \par Anti-obesity medications in the past: phendimetrazine - gives her anxiety. lost 15 lbs.  \par \par Reasons for desiring weight loss: fitting clothes better\par Perceived obstacles to losing weight: unsure, on tamoxifen\par \par Sleep: 7 hours "hadn't slept well in 5 months" due to night sweats\par \par Has 2 regular meals a day. \par \par Diet history:\par wakes up at: 6-7am.  \par B: skips.  just a banana sometimes. 100 rancho whole wheat english muffin w laughing cow cheese\par L: 11 am - salad w grilled chicken, some greek grain bowl w quinoa, chicken, veggies.  fruit. \par rare snack - 100 calorie chips, yogurt or fruit\par D: sometimes skips 2/7 if she doesn't have hunger. when she does, chicken/steak/shrimp w brown rice\par \par She meal preps.  \par \par snacks: sometimes\par eating after dinner: yes\par overeating episodes: yes\par \par Sodas/fast food/processed foods: pizza. \par \par Water intake per day: "not a big water drinker"  maybe 32 oz per day. \par diet coke - occasional. really tried to decrease this these days. 5 cans. \par crystal light in water sometimes. \par \par Physical activity:\par Patient enjoys: gym\par Current physical activity: walking 3-4 miles 4 days a week starting next week.  Has some weights at home, isn't doing that anymore. closer to 8k per day. 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

## 2023-01-19 NOTE — ASSESSMENT
[FreeTextEntry1] : 46 y.o F with overweight BMI, mild HLD, GERD recent breast CA diagnosis doing tamoxifen presents for weight management\par \par - will try trial of lomaira 8mg twice a day with meals\par - discussed she's gained 10 lbs over the last year and most distressing is that her clothes don’t fit and she's feeling uncomfortable, gabapentin, tamoxifen, going through cancer diagnosis all stressors/can cause weight gain and discussed expectations of 5-10% weight loss in 3-6 months with treatment, although cardio activity would be most beneficial as patient is intolerant to stimulants\par - discussed buying new clothes where she feels comfortable in the here and now\par - reach out to mental health provider/counselor to help support in journey with cancer diagnosis and stressors\par - discussed importance of mod-vig physical activity - patient will try, does state its very hard to fit into her day\par - avoid diet soda, cheese/dairy\par - incr water intake to 64 oz per day\par - incr moderate cardio activity\par \par \par f/u 4-6 weeks\par

## 2023-01-23 ENCOUNTER — TRANSCRIPTION ENCOUNTER (OUTPATIENT)
Age: 48
End: 2023-01-23

## 2023-03-07 ENCOUNTER — NON-APPOINTMENT (OUTPATIENT)
Age: 48
End: 2023-03-07

## 2023-03-08 ENCOUNTER — TRANSCRIPTION ENCOUNTER (OUTPATIENT)
Age: 48
End: 2023-03-08

## 2023-03-23 ENCOUNTER — APPOINTMENT (OUTPATIENT)
Dept: SURGICAL ONCOLOGY | Facility: CLINIC | Age: 48
End: 2023-03-23
Payer: COMMERCIAL

## 2023-03-23 VITALS
RESPIRATION RATE: 16 BRPM | DIASTOLIC BLOOD PRESSURE: 90 MMHG | WEIGHT: 150 LBS | BODY MASS INDEX: 26.58 KG/M2 | HEIGHT: 63 IN | OXYGEN SATURATION: 97 % | HEART RATE: 88 BPM | SYSTOLIC BLOOD PRESSURE: 136 MMHG

## 2023-03-23 DIAGNOSIS — R92.8 OTHER ABNORMAL AND INCONCLUSIVE FINDINGS ON DIAGNOSTIC IMAGING OF BREAST: ICD-10-CM

## 2023-03-23 PROCEDURE — 99215 OFFICE O/P EST HI 40 MIN: CPT

## 2023-03-30 ENCOUNTER — OUTPATIENT (OUTPATIENT)
Dept: OUTPATIENT SERVICES | Facility: HOSPITAL | Age: 48
LOS: 1 days | End: 2023-03-30
Payer: COMMERCIAL

## 2023-03-30 ENCOUNTER — APPOINTMENT (OUTPATIENT)
Dept: ULTRASOUND IMAGING | Facility: IMAGING CENTER | Age: 48
End: 2023-03-30
Payer: COMMERCIAL

## 2023-03-30 ENCOUNTER — APPOINTMENT (OUTPATIENT)
Dept: MAMMOGRAPHY | Facility: IMAGING CENTER | Age: 48
End: 2023-03-30
Payer: COMMERCIAL

## 2023-03-30 ENCOUNTER — RESULT REVIEW (OUTPATIENT)
Age: 48
End: 2023-03-30

## 2023-03-30 DIAGNOSIS — N84.0 POLYP OF CORPUS UTERI: Chronic | ICD-10-CM

## 2023-03-30 DIAGNOSIS — C50.912 MALIGNANT NEOPLASM OF UNSPECIFIED SITE OF LEFT FEMALE BREAST: ICD-10-CM

## 2023-03-30 DIAGNOSIS — Z98.49 CATARACT EXTRACTION STATUS, UNSPECIFIED EYE: Chronic | ICD-10-CM

## 2023-03-30 DIAGNOSIS — Z98.890 OTHER SPECIFIED POSTPROCEDURAL STATES: Chronic | ICD-10-CM

## 2023-03-30 PROCEDURE — 76641 ULTRASOUND BREAST COMPLETE: CPT | Mod: 26,LT

## 2023-03-30 PROCEDURE — G0279: CPT

## 2023-03-30 PROCEDURE — 77065 DX MAMMO INCL CAD UNI: CPT | Mod: 26,LT

## 2023-03-30 PROCEDURE — G0279: CPT | Mod: 26

## 2023-03-30 PROCEDURE — 77065 DX MAMMO INCL CAD UNI: CPT

## 2023-03-30 PROCEDURE — 76641 ULTRASOUND BREAST COMPLETE: CPT

## 2023-04-03 ENCOUNTER — RX RENEWAL (OUTPATIENT)
Age: 48
End: 2023-04-03

## 2023-05-17 ENCOUNTER — APPOINTMENT (OUTPATIENT)
Dept: BARIATRICS/WEIGHT MGMT | Facility: CLINIC | Age: 48
End: 2023-05-17
Payer: COMMERCIAL

## 2023-05-17 VITALS — WEIGHT: 157 LBS | BODY MASS INDEX: 27.81 KG/M2

## 2023-05-17 PROCEDURE — 99215 OFFICE O/P EST HI 40 MIN: CPT | Mod: 95

## 2023-05-18 RX ORDER — PHENTERMINE HYDROCHLORIDE 8 MG/1
8 TABLET ORAL TWICE DAILY
Qty: 60 | Refills: 0 | Status: DISCONTINUED | COMMUNITY
Start: 2023-01-19 | End: 2023-05-18

## 2023-05-18 RX ORDER — PEN NEEDLE, DIABETIC 32 GX 1/4"
32G X 6 MM NEEDLE, DISPOSABLE MISCELLANEOUS
Qty: 1 | Refills: 1 | Status: ACTIVE | COMMUNITY
Start: 2023-05-18 | End: 1900-01-01

## 2023-05-18 NOTE — HISTORY OF PRESENT ILLNESS
[Home] : at home, [unfilled] , at the time of the visit. [Other Location: e.g. Home (Enter Location, City,State)___] : at [unfilled] [Verbal consent obtained from patient] : the patient, [unfilled] [FreeTextEntry1] : Patient presents for weight loss and overweight/obese comorbidity management\par \par jenifer reports struggle losing weight\par has tried multiple medications without much help\par severe side effects GERD from rybelsus\par lomaira has helped, but not effective enough\par is open to diet guidance\par is a single mother, recent hx breast ca\par on tamoxifen which has contributed to changes in body discomfort and weight\par \par Due to comorbidities this patient requires medical treatment for overweight / obesity\par

## 2023-05-18 NOTE — ASSESSMENT
[FreeTextEntry1] : Bariatric surgery history: none\par Overweight / obesity comorbidities: hld\par Current anti-obesity medications: none\par Obesity medication side effects: n/a\par \par -start ozempic v low dose, 2 clicks, increase gradually; guidance provided\par -nutrition counseling provided, emphasize starch/vegetable dishes\par -continue efforts with activity\par -4 week follow up\par

## 2023-06-16 ENCOUNTER — APPOINTMENT (OUTPATIENT)
Dept: BARIATRICS/WEIGHT MGMT | Facility: CLINIC | Age: 48
End: 2023-06-16
Payer: COMMERCIAL

## 2023-06-16 VITALS — WEIGHT: 155 LBS | BODY MASS INDEX: 27.46 KG/M2

## 2023-06-16 PROCEDURE — 99215 OFFICE O/P EST HI 40 MIN: CPT | Mod: 95

## 2023-06-20 NOTE — ASSESSMENT
[FreeTextEntry1] : Bariatric surgery history: none\par Overweight / obesity comorbidities: hld\par Current anti-obesity medications: ozempic\par Obesity medication side effects: none\par \par -increase as instructed on ozempic\par -nutrition concepts reviewed again - discussed pitfalls of low carb high fat for weight loss\par -reviewed the tradeoff between starch vs ABF as a calorie source\par -continue 2 month follow ups\par -new cooking resources shared\par -provided new home exercise resources, as PA is v minimal now; but encouraged walking over the summer as planned

## 2023-06-20 NOTE — HISTORY OF PRESENT ILLNESS
[Home] : at home, [unfilled] , at the time of the visit. [Other Location: e.g. Home (Enter Location, City,State)___] : at [unfilled] [Verbal consent obtained from patient] : the patient, [unfilled] [FreeTextEntry1] : Patient presents for weight loss and overweight/obese comorbidity management\par \par Macie has lost ~3 lbs\par medication is working well\par managing these well, up to 16 clicks\par has a few questions about dosing\par diet is currently with moderate fiber, but also following low carb\par has not had time to fully re-engage with diet\par \par Due to comorbidities this patient requires medical treatment for overweight / obesity\par

## 2023-07-12 ENCOUNTER — RESULT REVIEW (OUTPATIENT)
Age: 48
End: 2023-07-12

## 2023-07-13 ENCOUNTER — TRANSCRIPTION ENCOUNTER (OUTPATIENT)
Age: 48
End: 2023-07-13

## 2023-07-13 ENCOUNTER — OUTPATIENT (OUTPATIENT)
Dept: OUTPATIENT SERVICES | Facility: HOSPITAL | Age: 48
LOS: 1 days | Discharge: HOME | End: 2023-07-13

## 2023-07-13 DIAGNOSIS — Z98.890 OTHER SPECIFIED POSTPROCEDURAL STATES: Chronic | ICD-10-CM

## 2023-07-13 DIAGNOSIS — N84.0 POLYP OF CORPUS UTERI: Chronic | ICD-10-CM

## 2023-07-13 DIAGNOSIS — Z98.49 CATARACT EXTRACTION STATUS, UNSPECIFIED EYE: Chronic | ICD-10-CM

## 2023-07-13 DIAGNOSIS — C50.919 MALIGNANT NEOPLASM OF UNSPECIFIED SITE OF UNSPECIFIED FEMALE BREAST: ICD-10-CM

## 2023-07-17 ENCOUNTER — RX RENEWAL (OUTPATIENT)
Age: 48
End: 2023-07-17

## 2023-07-17 RX ORDER — AZELAIC ACID 0.15 G/G
15 AEROSOL, FOAM TOPICAL
Qty: 50 | Refills: 2 | Status: ACTIVE | COMMUNITY
Start: 2022-04-04 | End: 1900-01-01

## 2023-07-21 ENCOUNTER — APPOINTMENT (OUTPATIENT)
Dept: HEMATOLOGY ONCOLOGY | Facility: CLINIC | Age: 48
End: 2023-07-21
Payer: COMMERCIAL

## 2023-07-21 VITALS
OXYGEN SATURATION: 97 % | BODY MASS INDEX: 26.39 KG/M2 | RESPIRATION RATE: 16 BRPM | HEART RATE: 89 BPM | TEMPERATURE: 97.3 F | DIASTOLIC BLOOD PRESSURE: 86 MMHG | SYSTOLIC BLOOD PRESSURE: 117 MMHG | WEIGHT: 149 LBS

## 2023-07-21 DIAGNOSIS — R23.2 FLUSHING: ICD-10-CM

## 2023-07-21 DIAGNOSIS — T45.1X5A FLUSHING: ICD-10-CM

## 2023-07-21 PROCEDURE — 99214 OFFICE O/P EST MOD 30 MIN: CPT

## 2023-07-21 NOTE — ASSESSMENT
[FreeTextEntry1] : In summary, Ms. MERYL REYNA is a 46 year old premenopausal female with stage 1A  ( mpT1a,N0(sn) Mn/a) ER positive, [place] positive, HER-2/peterson negative invasive moderately-differentiated carcinoma with lobular features of the left breast. She is s/p lumpectomy on 10/6/21. ODX 13. She has h/o early onset cataracts and uterine polyps. RT completed 1/7/22, tamoxifen started 1/21/22\par \par - Breast ca: She is tolerating tamoxifen with expected side effects. Reports good compliance. Plan to continue tamoxifen for 10 years. Will switch to AI after menopause. GYN and opthal f/u annually due to risk of endometrial ca and cataracts respectively.  Up to date with Breast imaging\par - Hot flashes: 2/2 tamoxifen.  Advised to wear layers and use fan prn. Continue gabapentin\par - Wt gain: Life style changes d/w her. continue ozempic\par - Patient is aware of signs and symptoms of DVT/PE. Patient will report if she develops acute onset chest pain shortness of breath, leg swelling or calf pain. \par - Continue asa 81\par - She has insomnia, melatonin didn’t help. CBD gummies and Mag ok to try . Consider sleep study eval. \par RTC 6 m \par

## 2023-07-21 NOTE — HISTORY OF PRESENT ILLNESS
[T: ___] : T[unfilled] [N: ___] : N[unfilled] [AJCC Stage: ____] : AJCC Stage: [unfilled] [de-identified] : Ms. Macie Beatty is a 46 year old female here for an evaluation of breast cancer. Her oncologic history is as follows:\par \par She underwent routine breast imaging on 8/17/21(BIRADS 0) which showed a new heterogeneous mass of the left breast at 5:00 6 cm FN for which targetd left breast US is recommended.On 8/20/21 (BIRADS 4B) she underwent a left breast US which revealed indeterminate subcentimeter mass at the left 5:00 6 cm FN for which US guided core biopsy is rec, She underwent left breast 5 o'clock 6 cm FN core biopsy on 8/27/21 which showed invasive moderately-differentiated ductal carcinoma with lobular features, [place] score 6/9, measuring 0.5 cm, ER + 90%,[place] + 90% HER-2/peterson negative, intermediate grade solid type DCIS with cancerization of lobules. No lymphovascular permeation noted.\par \par She underwent a breast MRI on 9/15/21 (BIRADS 6) which showed 4mm enhancing focus slightly medial anterior to the clip at the site of biopsy proven malignancy in the left breast at the 5:00 position. No significant lymphadenopathy. \par \par She underwent left lower outer quadrant excision with SLNB on 10/6/21 which revealed  multifocal invasive lobular carcinoma, Nabila grade 3,score 6/9, with approximately 6 different foci with measurements ranging from 1.0 to 4.5 mm,  margins were negative, No lymphovascular invasion noted, Lobular carcinoma in situ, classical type, with focal collagenous spherulosis which focally involves an intraductal papilloma noted. Complex sclerosing lesions, Intraductal papillomata, fibrocystic changes, usual ductal hyperplasia, apocrine metaplasia, benign epithelial calcifications and are pseudoangiomatous stromal hyperplasia also noted. No DCIS noted  Two sentinel lymph node were removed and were negative(0/2) for metastasis.\par \par 11/1/21 ONCOTYPE score 13\par \par Opthal: ASHLEY Sahni [place]\par Cataract surgery in 2019, 2020\par \par Dr Yvonne Martin : GYN\par TVS 9/2021 normal\par \par 3/2022: She had hot flashes and night sweats and called 3/2/22, effexor was prescribed. \par She reports she is usually not an anxious person. She has noticed mild anxiety with the dx and treatment but worsened last week since she started effexor. Rec to stop effexor\par Hot flashes kept her up all night and that affected her day time performance\par Rec to try gabapentin 300 hs\par She denies mood changes, wt gain, vaginal dryness, SOB, chest pain, leg swelling or clotting issues. Her energy and appetite is good, wt stable. Eating healthy [de-identified] : In summary, Ms. MERYL REYNA is a 46 year old premenopausal female with stage 1A  ( mpT1a,N0(sn) Mn/a) ER positive, [place] positive, HER-2/peterson negative invasive moderately-differentiated carcinoma with lobular features of the left breast. She is s/p lumpectomy on 10/6/21. ODX 13. She has h/o early onset cataracts and uterine polyps. RT completed 1/7/22, tamoxifen started 1/21/22\par \par She took Rybelsus x 10 days 6/2022, had severe GI s/e. Stopped Rybelsus. S/p EGD/colonoscopy/CT- all good. Sx resolved. \par She is now on ozempic x 5/2023, lost 6 lbs\par She started gabapentin 300 hs 3/2022 for hot flashes, helping. Also on acupuncture\par She has insomnia, melatonin didn’t help. CBD gummies and Mag ok to try . Consider sleep study eval. \par She is taking tamoxifen with good compliance. She denies mood changes, wt gain, vaginal dryness, SOB, chest pain, leg swelling or clotting issues. Her energy and appetite is good, wt stable. \par LMP date: 11/2022, periods irregular since she started tamoxifen. \par GYN - Dr Madrigal saw 7/2023\par Breast imaging: with Kamlesh, 8/2022\par Opthal: annual f/u, Dr Murray

## 2023-07-21 NOTE — PHYSICAL EXAM
[Fully active, able to carry on all pre-disease performance without restriction] : Status 0 - Fully active, able to carry on all pre-disease performance without restriction [Normal] : affect appropriate [de-identified] : healed left breast scar

## 2023-07-21 NOTE — CONSULT LETTER
[Dear  ___] : Dear  [unfilled], [Consult Letter:] : I had the pleasure of evaluating your patient, [unfilled]. [Please see my note below.] : Please see my note below. [Consult Closing:] : Thank you very much for allowing me to participate in the care of this patient.  If you have any questions, please do not hesitate to contact me. [Sincerely,] : Sincerely, [FreeTextEntry2] : Terrell Orosco Beg [FreeTextEntry3] : Otilia Salazar MD\par Division of Medical Oncology and Hematology\par [facility] [place]\par [employer] [facility]\par [place], [place]\par  [DrBoom  ___] : Dr. PABLO [DrBoom ___] : Dr. PABLO

## 2023-07-21 NOTE — OB HISTORY
[Definite:  ___ (Date)] : the last menstrual period was [unfilled] [Currently In Menopause] : not currently in menopause [___] : Living: [unfilled] none

## 2023-07-27 NOTE — REASON FOR VISIT
[facility] CLINIC NOTE    Patient ID: Svetlana Ashton is a 72 y.o. female.    I. HISTORY     Chief Complaint:   Chief Complaint   Patient presents with    Results     Procedure room 1.  Arterial dopplers with KIYA's and Bilateral complete reflux study        HPI: Svetlana Ashton is a 72 y.o. female who presents today for follow-up and results to a bilateral complete venous reflux study and arterial duplex with ABIs.  Bilateral complete venous reflux study performed 07/27/2023 shows no evidence of DVT bilaterally.  It also shows dilation reflux of the bilateral great saphenous and left small saphenous veins.    Arterial duplex with ABIs shows a right KIYA of 0.84 and left KIYA 0.95.  No arterial occlusions noted.  Reduced pulses and elevated velocities noted bilaterally.  See radiology reading for details.    Patient was initially seen on 07/19/2023 by referral from Prashant Garcia MS, for evaluation of bilateral lower extremity swelling and discomfort.  Symptoms are progressive/worsening and began approximally 1 years ago.  Location is bilateral lower extremities below the knees. Symptoms are worse at the end of the day.  History of venous interventions includes none.  Positive, grandmother, family history of venous disease.  Patient states she does have some calf cramping when walking short distances and sometimes at night requiring her to swing her legs off the bed and tap her feet around on the floor to get relief.    Venous Disease Medical Necessity Documentation Initial Visit Date: 7/19/2023 Return Check Date:    Have you ever had a rupture or bleed from a varicose vein in your leg(s)?              [] Yes  [x] No   [] Yes   [] No   Have you ever been diagnosed with phlebitis, cellulitis, or inflammation in the area of the varicose veins of  your leg(s)?  [] Yes  [x] No    [] Yes   [] No   Do you have darkened or inflamed skin on your legs?   [x] Yes   [] No   [] Yes   [] No   Do you have leg swelling?     [x]  Yes   [] No   [] Yes   [] No   Do you have leg pain?   [x] Yes   [] No   [] Yes   [] No   If yes, describe the type of pain?    [x]   Stabbing []  Radiating []  Aching   [x]  Tightness [x]  Throbbing               []  Burning [x]  Cramping              Do you have leg discomfort?   [x] Yes   [] No   [] Yes   [] No   If yes, describe the type of discomfort?    [x]  Heaviness [x]  Fullness   []  Restlessness [x] Tired/Fatigued [] Itching              Have you ever worn compression hose?   [] Yes   [x] No   [] Yes   [] No   If yes, how long?           Do you elevate your legs while sitting?   [x] Yes   [] No   [] Yes   [] No   Does venous disease (varicose veins, ulcers, skin changes, leg pain/swelling) interfere with your daily life?  If yes, check activities you are limited or unable to do.    []  Shower  []   Walk  []  Exercise  [] Play with children/grandchildren  []  Shop [] Work [] Stand for any period of time [] Sleep                               [x] Sitting for an extended period of time.           [x] Yes   [] No   [] Yes   [] No   Do you exercise/have you tried to exercise (i.e.  Walk our participate in a regular exercise routine)?  [] Yes  [x] No   [] Yes   [] No   BMI   24.2           Past Medical History:   Diagnosis Date    Depression     Hyperlipidemia     Hypertension     Transient cerebral ischemia 02/28/2012        Past Surgical History:   Procedure Laterality Date    APPENDECTOMY      CARPAL TUNNEL RELEASE Right     CHOLECYSTECTOMY      ECTOPIC PREGNANCY SURGERY      HYSTERECTOMY      right wrist surgery - pins      VAGINAL DELIVERY      x3       Social History     Tobacco Use   Smoking Status Every Day    Packs/day: 1.00    Years: 40.00    Pack years: 40.00    Types: Cigarettes   Smokeless Tobacco Never         Current Outpatient Medications:     amLODIPine (NORVASC) 5 MG tablet, Take 1 tablet (5 mg total) by mouth once daily., Disp: 90 tablet, Rfl: 1    aspirin (ECOTRIN) 81 MG EC tablet, Take 81  mg by mouth once daily., Disp: , Rfl:     cyanocobalamin 1,000 mcg/mL injection, Inject 1 mL (1,000 mcg total) into the muscle every 30 days., Disp: 10 mL, Rfl: 0    fluticasone propionate (FLONASE) 50 mcg/actuation nasal spray, 2 sprays (100 mcg total) by Each Nostril route daily as needed for Rhinitis., Disp: 16 g, Rfl: 5    hydrOXYzine HCL (ATARAX) 25 MG tablet, Take 1 tablet (25 mg total) by mouth every 8 (eight) hours as needed for Itching., Disp: 60 tablet, Rfl: 5    levocetirizine (XYZAL) 5 MG tablet, Take 1 tablet (5 mg total) by mouth every evening., Disp: 90 tablet, Rfl: 1    levothyroxine (SYNTHROID) 50 MCG tablet, Take 1 tablet (50 mcg total) by mouth before breakfast., Disp: 90 tablet, Rfl: 1    metoprolol succinate (TOPROL-XL) 50 MG 24 hr tablet, Take 1 tablet (50 mg total) by mouth once daily., Disp: 90 tablet, Rfl: 1    temazepam (RESTORIL) 15 mg Cap, Take 1 capsule (15 mg total) by mouth nightly as needed (sleep)., Disp: 30 capsule, Rfl: 2    gabapentin (NEURONTIN) 300 MG capsule, Take 1 capsule (300 mg total) by mouth every evening. (Patient not taking: Reported on 5/18/2023), Disp: 30 capsule, Rfl: 5    Review of Systems   Constitutional:  Negative for activity change, chills, diaphoresis, fatigue and fever.   Respiratory:  Negative for cough and shortness of breath.    Cardiovascular:  Positive for leg swelling. Negative for chest pain and claudication.        Hyperpigmentation LE   Gastrointestinal:  Negative for nausea and vomiting.   Musculoskeletal:  Positive for leg pain. Negative for joint swelling.   Integumentary:  Negative for rash and wound.   Neurological:  Negative for weakness and numbness.        II. PHYSICAL EXAM     Physical Exam  Constitutional:       General: She is awake. She is not in acute distress.     Appearance: Normal appearance. She is not ill-appearing or toxic-appearing.   HENT:      Head: Normocephalic and atraumatic.   Eyes:      Extraocular Movements: Extraocular  movements intact.      Conjunctiva/sclera: Conjunctivae normal.      Pupils: Pupils are equal, round, and reactive to light.   Neck:      Vascular: No carotid bruit or JVD.   Cardiovascular:      Rate and Rhythm: Normal rate and regular rhythm.      Pulses:           Dorsalis pedis pulses are detected w/ Doppler on the right side and detected w/ Doppler on the left side.        Posterior tibial pulses are detected w/ Doppler on the right side and detected w/ Doppler on the left side.      Heart sounds: No murmur heard.  Pulmonary:      Effort: Pulmonary effort is normal. No respiratory distress.      Breath sounds: No stridor. No wheezing, rhonchi or rales.   Musculoskeletal:         General: No swelling, tenderness or deformity.      Right lower leg: No edema.      Left lower leg: No edema.      Comments: No evidence of cellulitis or open ulceration bilaterally.   Feet:      Comments: Turbulent biphasic hand-held dopplerable pulses of the bilateral dorsalis pedis and posterior tibial arteries.  Skin:     General: Skin is warm.      Capillary Refill: Capillary refill takes less than 2 seconds.      Coloration: Skin is not ashen.      Findings: No bruising, erythema, lesion, rash or wound.   Neurological:      Mental Status: She is alert and oriented to person, place, and time.      Motor: No weakness.   Psychiatric:         Speech: Speech normal.         Behavior: Behavior normal. Behavior is cooperative.       Reticular/Spider veins noted:  RLE: medial calf, ankle, and foot  LLE: anterior calf, medial calf, ankle, and foot    Varicose veins noted:  RLE: none  LLE:  none    CEAP Classification  Clinical Signs: Class 3 - Edema  Etiologic Classification: Primary  Anatomic distribution: Superficial  Pathophysiologic dysfunction: Reflux                     Venous Clinical Severity Score  Pain:2=Daily, moderate activity limitation, occasional analgesics  Varicose Veins: 0=None  Venous Edema: 1=Evening ankle edema  only  Pigmentation: 0=None or focal, low intensity (tan)  Inflammation: 0=None  Induration: 0=None  Number of Active Ulcers: 0=0  Active Ulceration, Duration: 0=None  Active Ulcer Size: 0=None  Compressive Therapy: 0=Not used or not compliant  Total Score: 3     III. ASSESSMENT & PLAN (MEDICAL DECISION MAKING)     1. Bilateral lower extremity edema    2. Bilateral leg pain    3. Venous insufficiency    4. Peripheral vascular disease        Assessment/Diagnosis and Plan:  Ultrasound of lower extremities reveals positive evidence of venous insufficiency in the bilateral great saphenous and left small saphenous veins.  Plan for conservative medical treatment at this time. The patient may benefit from endovenous ablation in the future. She also needs to continue taking her 81 mg aspirin as well as increasing efforts for cardiovascular exercise.    - Start compression with 15-20 mmHg Rx stockings  - Therapeutic leg elevation  - Calf pumping exercises  - RTC 3 months for further evaluation        Jacob Henderson DO       [Routine On-Treatment] : a routine on-treatment visit for [Breast Cancer] : breast cancer

## 2023-08-04 ENCOUNTER — APPOINTMENT (OUTPATIENT)
Dept: BARIATRICS/WEIGHT MGMT | Facility: CLINIC | Age: 48
End: 2023-08-04
Payer: COMMERCIAL

## 2023-08-04 PROCEDURE — 99214 OFFICE O/P EST MOD 30 MIN: CPT | Mod: 95

## 2023-08-08 NOTE — ASSESSMENT
[FreeTextEntry1] : Bariatric surgery history: none Overweight / obesity comorbidities: hld Current anti-obesity medications: ozempic Obesity medication side effects: nausea  -guidance provided on adjusting medication -new resources provided for help with dietary WL -3 mo follow up

## 2023-08-08 NOTE — HISTORY OF PRESENT ILLNESS
[Home] : at home, [unfilled] , at the time of the visit. [Other Location: e.g. Home (Enter Location, City,State)___] : at [unfilled] [Verbal consent obtained from patient] : the patient, [unfilled] [FreeTextEntry1] : Patient presents for weight loss and overweight/obese comorbidity management  jenifer is adjusting to the medication has gone down on dose, now incr again based on side effects does not have any questions about nutrition has been losing weight slowly at a pace that is somewhat disappointing previously reported a low carb approach to WL  Due to comorbidities this patient requires medical treatment for overweight / obesity

## 2023-08-21 ENCOUNTER — APPOINTMENT (OUTPATIENT)
Dept: MAMMOGRAPHY | Facility: CLINIC | Age: 48
End: 2023-08-21
Payer: COMMERCIAL

## 2023-08-21 ENCOUNTER — RESULT REVIEW (OUTPATIENT)
Age: 48
End: 2023-08-21

## 2023-08-21 ENCOUNTER — OUTPATIENT (OUTPATIENT)
Dept: OUTPATIENT SERVICES | Facility: HOSPITAL | Age: 48
LOS: 1 days | End: 2023-08-21
Payer: COMMERCIAL

## 2023-08-21 ENCOUNTER — APPOINTMENT (OUTPATIENT)
Dept: ULTRASOUND IMAGING | Facility: CLINIC | Age: 48
End: 2023-08-21
Payer: COMMERCIAL

## 2023-08-21 DIAGNOSIS — C50.912 MALIGNANT NEOPLASM OF UNSPECIFIED SITE OF LEFT FEMALE BREAST: ICD-10-CM

## 2023-08-21 DIAGNOSIS — N84.0 POLYP OF CORPUS UTERI: Chronic | ICD-10-CM

## 2023-08-21 DIAGNOSIS — Z98.49 CATARACT EXTRACTION STATUS, UNSPECIFIED EYE: Chronic | ICD-10-CM

## 2023-08-21 DIAGNOSIS — Z98.890 OTHER SPECIFIED POSTPROCEDURAL STATES: Chronic | ICD-10-CM

## 2023-08-21 PROCEDURE — G0279: CPT

## 2023-08-21 PROCEDURE — 77066 DX MAMMO INCL CAD BI: CPT | Mod: 26

## 2023-08-21 PROCEDURE — G0279: CPT | Mod: 26

## 2023-08-21 PROCEDURE — 76641 ULTRASOUND BREAST COMPLETE: CPT | Mod: 26,50

## 2023-08-21 PROCEDURE — 77066 DX MAMMO INCL CAD BI: CPT

## 2023-08-21 PROCEDURE — 76641 ULTRASOUND BREAST COMPLETE: CPT

## 2023-08-22 PROBLEM — R92.8 ABNORMAL FINDING ON BREAST IMAGING: Status: ACTIVE | Noted: 2023-08-22

## 2023-08-22 NOTE — HISTORY OF PRESENT ILLNESS
[de-identified] : 47 year-old lady:\par \par \par She presents today with a ~2-week history of a sensation of swelling, hardness, and discomfort, in the lower outer part of her left breast.\par No preceding injury or strain.\par No other specific or constitutional signs or symptoms presently.\par \par \par 10/6/2021:\par LEFT BREAST conserving operation with oncoplastic closure (Dr. Vijay BIRMINGHAM).\par Surgical pathology:\par 1.  >6 foci of invasive lobular carcinoma.\par Largest measured 4.5 mm.\par Negative margins.\par 2.  0/2 SLN's.\par \par Postoperatively: Met with medical oncology (Dr. Otilia JENNINGS).\par Plan: Started tamoxifen the third week of 2022\par \par 2022:\par Completed XRT: Dr. Aiyana TERRY.\par \par \par 2021:\par At her index visit with me, she did not want to pursue genetic testing.\par 2021 she met with Ms. Uyen ALAS in our division of medical genetics, and testing was submitted:\par Inv[facility]ae: NO deleterious mutations\par \par \par 2021:\par She was referred by her gynecologist (Dr. Yvonne Kurtz) with a recently diagnosed LEFT BREAST (5:00) CANCER.\par \par ER/MI +.\par HER-2 negative.\par \par This was an asymptomatic, nonpalpable, abnormality identified on annual breast imaging at [place].\par \par + Prior personal history.\par Bilateral breast cyst aspirations: Benign.\par No other procedures related to either breast.\par \par + FH:\par Paternal grandmother had breast cancer in her mid 40s.\par She also had colorectal cancer.\par \par No other relatives with breast cancer.\par No relatives with ovarian cancer.\par \par + Ashkenazi.\par \par Other relatives with a history of malignancy:\par Paternal grandmother: CRC.\par She also had breast cancer (above).\par \par Maternal grandmother: Glioblastoma multiforme.\par Maternal great grandmother: Multiple myeloma.\par \par \par Tyrer-Cuzick risk score =10.2%.\par Breast cancer risk score, (calculated postoperatively due to the presence of LCIS associated with her ILC): 56.1%.\par \par \par 21:\par Annual, bilateral, mammogram and sonogram at [place]: BI-RADS 0.\par New, left breast (5:00) heterogeneous mass.\par \par 2021:\par Targeted left breast ultrasound at [place]: BI-RADS 4.\par 3 x 4 x 3 mm, slightly vertically oriented, new, nodule.\par Core biopsy recommended.\par \par 2021:\par Core biopsy at [place] provided the above diagnosis.\par \par \par Reproductive history:\par Menarche at 13.\par  2, para 1 at 40.\par She was not having regular menstrual periods due to her Mirena IUD; this was removed 2021 after the diagnosis of breast cancer..\par \par \par PMD: Dr. Sinan DE LEON.\par He retired in .\par She will be getting a new physician, she is just not sure who it will be..\par \par No pacemaker or defibrillator.\par No anticoagulants.\par 81 mg aspirin initiated by medical oncology along with tamoxifen (below)\par \par + GERD, diagnosed by EGD by Dr Justyna TRIVEDI.\par She takes omeprazole, and in the summer  started sucralfate also..\par \par + Tamoxifen\par \par \par GYN: Dr. Yvonne Kurtz.\par She had her Mirena IUD removed 2021.\par \par \par 2022 colonoscopy with Dr. Justyna TRIVEDI: Okay x10 years.

## 2023-08-22 NOTE — ASSESSMENT
[FreeTextEntry1] : Today she presents with a 2-week history of discomfort and a sensation of swelling in the lower outer quadrant of the conserved left breast.  My examination today is normal.  Annual bilateral imaging in August 2022 was normal.  Given the interval development of signs and symptoms, and the 6-month period since her last imaging, I suggested a diagnostic left mammogram and ultrasound presently. She understands and agrees. Those prescriptions were entered today.  I have asked her to call me a week after the imaging to discuss the results.  For her mammogram and sonogram of the left breast are normal, but her signs and symptoms persist, we will arrange for a follow-up breast MRI...................... Her September 2021 preoperative study had been normal.   September 2021: Preoperative imaging Chest x-ray: Normal. Abdominal sonogram: Normal. Pelvic ultrasound: Normal. Bone scan: No evidence of metastatic disease. Breast MRI: BI-RADS 6, no malignancy corresponds to a 4 mm enhancing focus.   August 2022: Bilateral mammogram and sonogram at [facility]  She should have periodic surveillance breast MRIs: Breast cancer risk score =56.1%..............  If her imaging is normal, we should see her at a 4-6-month interval, sooner if needed    7/11/2023. I returned her call. She will be starting acupuncture with Dr. Phong Abrams, to manage perimenopausal symptoms related to the tamoxifen. She was wondering if it is safe to have treatments in her left arm, from where she only had 2 sentinel nodes removed.  I told her there is no contraindication to acupuncture involving that extremity.   8/22/2023. Yesterday (August 21, 2023) she had her annual bilateral mammogram and sonogram at [place]: BI-RADS 4A. There is an enlarging circumscribed right mass in the outer right breast with a probable sonographic correlate at 9:00 (5 cm FN) measuring 3 x 4 x 4 mm on ultrasound.  Sonogram guided core biopsy with clip placement and postprocedure mammogram recommended to assure concordance between the radiographic and sonographic findings.  She is leaving for a weeklong cruise on August 27 and would like to have the procedure when she returns.  Paperwork for scheduling submitted today.  Separate from the above, she recently was having abdominal discomfort. Upper and lower endoscopy were normal. Her gastroenterologist (Dr. MARCELA Hudson) Center for abdominal CAT scan, not through our system, that apparently demonstrated equivocal findings in the pancreas. She was too claustrophobic to have the recommended abdominal MRI/MRCP. She will forward a copy of the abdominal CT scan report to me for review and entry into the records.

## 2023-08-22 NOTE — PHYSICAL EXAM
[Normal] : supple, no neck mass and thyroid not enlarged [Normal Neck Lymph Nodes] : normal neck lymph nodes  [Normal Supraclavicular Lymph Nodes] : normal supraclavicular lymph nodes [Normal Axillary Lymph Nodes] : normal axillary lymph nodes [Normal] : normal appearance, no rash, nodules, vesicles, ulcers, erythema [de-identified] : Groins not examined [de-identified] : Below

## 2023-08-22 NOTE — REASON FOR VISIT
[Follow-Up Visit] : a follow-up visit for [Other: _____] : [unfilled] [FreeTextEntry2] : Left breast, stage I cancer treated with lumpectomy, and oncoplastic closure.

## 2023-09-06 ENCOUNTER — APPOINTMENT (OUTPATIENT)
Dept: ULTRASOUND IMAGING | Facility: CLINIC | Age: 48
End: 2023-09-06
Payer: COMMERCIAL

## 2023-09-06 ENCOUNTER — OUTPATIENT (OUTPATIENT)
Dept: OUTPATIENT SERVICES | Facility: HOSPITAL | Age: 48
LOS: 1 days | End: 2023-09-06
Payer: COMMERCIAL

## 2023-09-06 ENCOUNTER — RESULT REVIEW (OUTPATIENT)
Age: 48
End: 2023-09-06

## 2023-09-06 DIAGNOSIS — N84.0 POLYP OF CORPUS UTERI: Chronic | ICD-10-CM

## 2023-09-06 DIAGNOSIS — Z98.49 CATARACT EXTRACTION STATUS, UNSPECIFIED EYE: Chronic | ICD-10-CM

## 2023-09-06 DIAGNOSIS — R92.8 OTHER ABNORMAL AND INCONCLUSIVE FINDINGS ON DIAGNOSTIC IMAGING OF BREAST: ICD-10-CM

## 2023-09-06 DIAGNOSIS — Z98.890 OTHER SPECIFIED POSTPROCEDURAL STATES: Chronic | ICD-10-CM

## 2023-09-06 PROCEDURE — 77065 DX MAMMO INCL CAD UNI: CPT

## 2023-09-06 PROCEDURE — 88307 TISSUE EXAM BY PATHOLOGIST: CPT | Mod: 26

## 2023-09-06 PROCEDURE — 19083 BX BREAST 1ST LESION US IMAG: CPT

## 2023-09-06 PROCEDURE — 88342 IMHCHEM/IMCYTCHM 1ST ANTB: CPT

## 2023-09-06 PROCEDURE — A4648: CPT

## 2023-09-06 PROCEDURE — 88360 TUMOR IMMUNOHISTOCHEM/MANUAL: CPT

## 2023-09-06 PROCEDURE — 88364 INSITU HYBRIDIZATION (FISH): CPT | Mod: 26

## 2023-09-06 PROCEDURE — 88342 IMHCHEM/IMCYTCHM 1ST ANTB: CPT | Mod: 26

## 2023-09-06 PROCEDURE — 88307 TISSUE EXAM BY PATHOLOGIST: CPT

## 2023-09-06 PROCEDURE — 88364 INSITU HYBRIDIZATION (FISH): CPT

## 2023-09-06 PROCEDURE — 77065 DX MAMMO INCL CAD UNI: CPT | Mod: 26,RT

## 2023-09-06 PROCEDURE — 88341 IMHCHEM/IMCYTCHM EA ADD ANTB: CPT

## 2023-09-06 PROCEDURE — 88365 INSITU HYBRIDIZATION (FISH): CPT

## 2023-09-06 PROCEDURE — 19083 BX BREAST 1ST LESION US IMAG: CPT | Mod: RT

## 2023-09-06 PROCEDURE — 88365 INSITU HYBRIDIZATION (FISH): CPT | Mod: 26,59

## 2023-09-06 PROCEDURE — 88341 IMHCHEM/IMCYTCHM EA ADD ANTB: CPT | Mod: 26,59

## 2023-09-06 PROCEDURE — 88360 TUMOR IMMUNOHISTOCHEM/MANUAL: CPT | Mod: 26

## 2023-09-10 LAB — SURGICAL PATHOLOGY STUDY: SIGNIFICANT CHANGE UP

## 2023-09-24 PROBLEM — D05.00 LOBULAR CARCINOMA IN SITU (LCIS) OF BREAST, UNSPECIFIED LATERALITY: Status: ACTIVE | Noted: 2023-09-24

## 2023-09-25 ENCOUNTER — APPOINTMENT (OUTPATIENT)
Dept: SURGICAL ONCOLOGY | Facility: CLINIC | Age: 48
End: 2023-09-25
Payer: COMMERCIAL

## 2023-09-25 VITALS
RESPIRATION RATE: 17 BRPM | OXYGEN SATURATION: 97 % | DIASTOLIC BLOOD PRESSURE: 86 MMHG | HEIGHT: 63 IN | SYSTOLIC BLOOD PRESSURE: 133 MMHG | BODY MASS INDEX: 26.36 KG/M2 | WEIGHT: 148.8 LBS | HEART RATE: 92 BPM

## 2023-09-25 DIAGNOSIS — D05.00 LOBULAR CARCINOMA IN SITU OF UNSPECIFIED BREAST: ICD-10-CM

## 2023-09-25 PROCEDURE — 99215 OFFICE O/P EST HI 40 MIN: CPT

## 2023-10-21 ENCOUNTER — NON-APPOINTMENT (OUTPATIENT)
Age: 48
End: 2023-10-21

## 2023-11-06 ENCOUNTER — RX RENEWAL (OUTPATIENT)
Age: 48
End: 2023-11-06

## 2023-11-06 RX ORDER — SEMAGLUTIDE 1.34 MG/ML
4 INJECTION, SOLUTION SUBCUTANEOUS
Qty: 1 | Refills: 5 | Status: ACTIVE | COMMUNITY
Start: 2023-05-17 | End: 1900-01-01

## 2023-11-15 ENCOUNTER — NON-APPOINTMENT (OUTPATIENT)
Age: 48
End: 2023-11-15

## 2023-11-17 ENCOUNTER — APPOINTMENT (OUTPATIENT)
Dept: BARIATRICS/WEIGHT MGMT | Facility: CLINIC | Age: 48
End: 2023-11-17
Payer: COMMERCIAL

## 2023-11-17 DIAGNOSIS — E66.3 OVERWEIGHT: ICD-10-CM

## 2023-11-17 DIAGNOSIS — E78.5 HYPERLIPIDEMIA, UNSPECIFIED: ICD-10-CM

## 2023-11-17 DIAGNOSIS — G47.00 INSOMNIA, UNSPECIFIED: ICD-10-CM

## 2023-11-17 PROCEDURE — 99214 OFFICE O/P EST MOD 30 MIN: CPT | Mod: 95

## 2023-11-17 RX ORDER — TRAZODONE HYDROCHLORIDE 50 MG/1
50 TABLET ORAL
Qty: 30 | Refills: 5 | Status: ACTIVE | COMMUNITY
Start: 2023-11-17 | End: 1900-01-01

## 2023-12-10 ENCOUNTER — NON-APPOINTMENT (OUTPATIENT)
Age: 48
End: 2023-12-10

## 2023-12-11 ENCOUNTER — TRANSCRIPTION ENCOUNTER (OUTPATIENT)
Age: 48
End: 2023-12-11

## 2023-12-13 ENCOUNTER — NON-APPOINTMENT (OUTPATIENT)
Age: 48
End: 2023-12-13

## 2024-01-18 ENCOUNTER — OUTPATIENT (OUTPATIENT)
Dept: OUTPATIENT SERVICES | Facility: HOSPITAL | Age: 49
LOS: 1 days | Discharge: HOME | End: 2024-01-18

## 2024-01-18 DIAGNOSIS — C50.919 MALIGNANT NEOPLASM OF UNSPECIFIED SITE OF UNSPECIFIED FEMALE BREAST: ICD-10-CM

## 2024-01-18 DIAGNOSIS — N84.0 POLYP OF CORPUS UTERI: Chronic | ICD-10-CM

## 2024-01-18 DIAGNOSIS — Z98.890 OTHER SPECIFIED POSTPROCEDURAL STATES: Chronic | ICD-10-CM

## 2024-01-18 DIAGNOSIS — Z98.49 CATARACT EXTRACTION STATUS, UNSPECIFIED EYE: Chronic | ICD-10-CM

## 2024-01-24 ENCOUNTER — RESULT REVIEW (OUTPATIENT)
Age: 49
End: 2024-01-24

## 2024-01-24 ENCOUNTER — APPOINTMENT (OUTPATIENT)
Dept: HEMATOLOGY ONCOLOGY | Facility: CLINIC | Age: 49
End: 2024-01-24
Payer: COMMERCIAL

## 2024-01-24 VITALS
HEART RATE: 90 BPM | WEIGHT: 144.18 LBS | SYSTOLIC BLOOD PRESSURE: 125 MMHG | DIASTOLIC BLOOD PRESSURE: 85 MMHG | TEMPERATURE: 97.7 F | RESPIRATION RATE: 16 BRPM | OXYGEN SATURATION: 99 % | BODY MASS INDEX: 25.54 KG/M2

## 2024-01-24 DIAGNOSIS — Z79.810 LONG TERM (CURRENT) USE OF SELECTIVE ESTROGEN RECEPTOR MODULATORS (SERMS): ICD-10-CM

## 2024-01-24 DIAGNOSIS — C50.912 MALIGNANT NEOPLASM OF UNSPECIFIED SITE OF LEFT FEMALE BREAST: ICD-10-CM

## 2024-01-24 LAB
BASOPHILS # BLD AUTO: 0.05 K/UL — SIGNIFICANT CHANGE UP (ref 0–0.2)
BASOPHILS NFR BLD AUTO: 0.7 % — SIGNIFICANT CHANGE UP (ref 0–2)
EOSINOPHIL # BLD AUTO: 0.21 K/UL — SIGNIFICANT CHANGE UP (ref 0–0.5)
EOSINOPHIL NFR BLD AUTO: 2.9 % — SIGNIFICANT CHANGE UP (ref 0–6)
HCT VFR BLD CALC: 35 % — SIGNIFICANT CHANGE UP (ref 34.5–45)
HGB BLD-MCNC: 11.5 G/DL — SIGNIFICANT CHANGE UP (ref 11.5–15.5)
IMM GRANULOCYTES NFR BLD AUTO: 0.3 % — SIGNIFICANT CHANGE UP (ref 0–0.9)
LYMPHOCYTES # BLD AUTO: 2.13 K/UL — SIGNIFICANT CHANGE UP (ref 1–3.3)
LYMPHOCYTES # BLD AUTO: 29.8 % — SIGNIFICANT CHANGE UP (ref 13–44)
MCHC RBC-ENTMCNC: 31 PG — SIGNIFICANT CHANGE UP (ref 27–34)
MCHC RBC-ENTMCNC: 32.9 G/DL — SIGNIFICANT CHANGE UP (ref 32–36)
MCV RBC AUTO: 94.3 FL — SIGNIFICANT CHANGE UP (ref 80–100)
MONOCYTES # BLD AUTO: 0.53 K/UL — SIGNIFICANT CHANGE UP (ref 0–0.9)
MONOCYTES NFR BLD AUTO: 7.4 % — SIGNIFICANT CHANGE UP (ref 2–14)
NEUTROPHILS # BLD AUTO: 4.21 K/UL — SIGNIFICANT CHANGE UP (ref 1.8–7.4)
NEUTROPHILS NFR BLD AUTO: 58.9 % — SIGNIFICANT CHANGE UP (ref 43–77)
NRBC # BLD: 0 /100 WBCS — SIGNIFICANT CHANGE UP (ref 0–0)
PLATELET # BLD AUTO: 262 K/UL — SIGNIFICANT CHANGE UP (ref 150–400)
RBC # BLD: 3.71 M/UL — LOW (ref 3.8–5.2)
RBC # FLD: 12.7 % — SIGNIFICANT CHANGE UP (ref 10.3–14.5)
WBC # BLD: 7.15 K/UL — SIGNIFICANT CHANGE UP (ref 3.8–10.5)
WBC # FLD AUTO: 7.15 K/UL — SIGNIFICANT CHANGE UP (ref 3.8–10.5)

## 2024-01-24 PROCEDURE — 99214 OFFICE O/P EST MOD 30 MIN: CPT

## 2024-01-25 LAB
ALBUMIN SERPL ELPH-MCNC: 4.6 G/DL
ALP BLD-CCNC: 60 U/L
ALT SERPL-CCNC: 11 U/L
ANION GAP SERPL CALC-SCNC: 13 MMOL/L
AST SERPL-CCNC: 16 U/L
BILIRUB SERPL-MCNC: 0.2 MG/DL
BUN SERPL-MCNC: 14 MG/DL
CALCIUM SERPL-MCNC: 9.8 MG/DL
CHLORIDE SERPL-SCNC: 103 MMOL/L
CO2 SERPL-SCNC: 26 MMOL/L
CREAT SERPL-MCNC: 0.79 MG/DL
EGFR: 92 ML/MIN/1.73M2
ESTRADIOL SERPL-MCNC: 42 PG/ML
FSH SERPL-MCNC: 23.2 IU/L
GLUCOSE SERPL-MCNC: 85 MG/DL
POTASSIUM SERPL-SCNC: 4.7 MMOL/L
PROT SERPL-MCNC: 7.2 G/DL
SODIUM SERPL-SCNC: 142 MMOL/L

## 2024-01-30 PROBLEM — Z79.810 CARE RELATED TO CURRENT TAMOXIFEN USE: Status: ACTIVE | Noted: 2022-03-09

## 2024-01-30 RX ORDER — TAMOXIFEN CITRATE 20 MG/1
20 TABLET, FILM COATED ORAL DAILY
Qty: 90 | Refills: 1 | Status: ACTIVE | COMMUNITY
Start: 2021-11-08 | End: 1900-01-01

## 2024-01-30 RX ORDER — GABAPENTIN 300 MG/1
300 CAPSULE ORAL
Qty: 90 | Refills: 1 | Status: ACTIVE | COMMUNITY
Start: 2022-03-09 | End: 1900-01-01

## 2024-01-30 NOTE — HISTORY OF PRESENT ILLNESS
[T: ___] : T[unfilled] [N: ___] : N[unfilled] [AJCC Stage: ____] : AJCC Stage: [unfilled] [de-identified] : Ms. Macie Beatty is a 46 year old female here for an evaluation of breast cancer. Her oncologic history is as follows:  She underwent routine breast imaging on 8/17/21(BIRADS 0) which showed a new heterogeneous mass of the left breast at 5:00 6 cm FN for which targetd left breast US is recommended.On 8/20/21 (BIRADS 4B) she underwent a left breast US which revealed indeterminate subcentimeter mass at the left 5:00 6 cm FN for which US guided core biopsy is rec, She underwent left breast 5 o'clock 6 cm FN core biopsy on 8/27/21 which showed invasive moderately-differentiated ductal carcinoma with lobular features, [place] score 6/9, measuring 0.5 cm, ER + 90%,TX + 90% HER-2/peterson negative, intermediate grade solid type DCIS with cancerization of lobules. No lymphovascular permeation noted.  She underwent a breast MRI on 9/15/21 (BIRADS 6) which showed 4mm enhancing focus slightly medial anterior to the clip at the site of biopsy proven malignancy in the left breast at the 5:00 position. No significant lymphadenopathy.   She underwent left lower outer quadrant excision with SLNB on 10/6/21 which revealed  multifocal invasive lobular carcinoma, [place] grade 3,score 6/9, with approximately 6 different foci with measurements ranging from 1.0 to 4.5 mm,  margins were negative, No lymphovascular invasion noted, Lobular carcinoma in situ, classical type, with focal collagenous spherulosis which focally involves an intraductal papilloma noted. Complex sclerosing lesions, Intraductal papillomata, fibrocystic changes, usual ductal hyperplasia, apocrine metaplasia, benign epithelial calcifications and are pseudoangiomatous stromal hyperplasia also noted. No DCIS noted  Two sentinel lymph node were removed and were negative(0/2) for metastasis.  11/1/21 ONCOTYPE score 13  Opthal: Alvaro Murray [facility] [place] Cataract surgery in 2019, 2020  Dr Yvonne Martin : GYN TVS 9/2021 normal  3/2022: She had hot flashes and night sweats and called 3/2/22, effexor was prescribed.  She reports she is usually not an anxious person. She has noticed mild anxiety with the dx and treatment but worsened last week since she started effexor. Rec to stop effexor Hot flashes kept her up all night and that affected her day time performance Rec to try gabapentin 300 hs She denies mood changes, wt gain, vaginal dryness, SOB, chest pain, leg swelling or clotting issues. Her energy and appetite is good, wt stable. Eating healthy  She took Rybelsus x 10 days 6/2022, had severe GI s/e. Stopped Rybelsus. S/p EGD/colonoscopy/CT- all good. Sx resolved.  [de-identified] : In summary, Ms. MERYL REYNA is a 46 year old premenopausal female with stage 1A  ( mpT1a,N0(sn) Mn/a) ER positive, [place] positive, HER-2/peterson negative invasive moderately-differentiated carcinoma with lobular features of the left breast. She is s/p lumpectomy on 10/6/21. ODX 13. She has h/o early onset cataracts and uterine polyps. RT completed 1/7/22, tamoxifen started 1/21/22  She has insomnia, melatonin didn't help.  Now on trazodone. She started gabapentin 300 hs 3/2022 for hot flashes, helping. Also on acupuncture She is taking tamoxifen with good compliance. She denies mood changes, wt gain, vaginal dryness, SOB, chest pain, leg swelling or clotting issues. Her energy and appetite is good, wt stable.  She is now on ozempic x 5/2023, lost 6 lbs.  LMP date: 11/2022, periods irregular since she started tamoxifen. FSH/E tested today are not in complete menopausal range. Will check again at next visit GYN - Dr Madrigal saw 7/2023 Breast imaging: with [facility], 8/2022, 9/2023 Opthal: annual f/u, Dr Murray

## 2024-01-30 NOTE — CONSULT LETTER
[Dear  ___] : Dear  [unfilled], [Consult Letter:] : I had the pleasure of evaluating your patient, [unfilled]. [Please see my note below.] : Please see my note below. [Consult Closing:] : Thank you very much for allowing me to participate in the care of this patient.  If you have any questions, please do not hesitate to contact me. [Sincerely,] : Sincerely, [DrBoom ___] : Dr. PABLO [DrBoom  ___] : Dr. PABLO [FreeTextEntry2] : Terrell Orosco Beg [FreeTextEntry3] : Otilia Salazar MD\par  Division of Medical Oncology and Hematology\par  [facility]\par  [employer] [facility]\par  [place], [place]\par

## 2024-01-30 NOTE — PHYSICAL EXAM
[Fully active, able to carry on all pre-disease performance without restriction] : Status 0 - Fully active, able to carry on all pre-disease performance without restriction [Normal] : affect appropriate [de-identified] : healed left breast scar

## 2024-01-30 NOTE — ASSESSMENT
[FreeTextEntry1] :  In summary, Ms. MERYL REYNA is a 46 year old premenopausal female with stage 1A ( mpT1a,N0(sn) Mn/a) ER positive, [place] positive, HER-2/peterson negative invasive moderately-differentiated carcinoma with lobular features of the left breast. She is s/p lumpectomy on 10/6/21. ODX 13. She has h/o early onset cataracts and uterine polyps. RT completed 1/7/22, tamoxifen started 1/21/22  - Breast ca: She is tolerating tamoxifen with expected side effects. Reports good compliance. Plan to continue tamoxifen for 10 years. Will switch to AI after menopause. GYN and opthal f/u annually due to risk of endometrial ca and cataracts respectively. Up to date with Breast imaging LMP date: 11/2022, periods irregular since she started tamoxifen. FSH/E tested today are not in complete menopausal range. Will check again at next visit - Hot flashes: 2/2 tamoxifen. Advised to wear layers and use fan prn. Continue gabapentin - Wt gain: Life style changes d/w her. continue ozempic - Patient is aware of signs and symptoms of DVT/PE. Patient will report if she develops acute onset chest pain shortness of breath, leg swelling or calf pain. - Continue asa 81 - She has insomnia, melatonin didn't help.  Now on trazodone. She started gabapentin 300 hs 3/2022 for hot flashes, helping. Also on acupuncture - She feels pain/swelling in neck. No palpable LN. Would do sono rto 6m

## 2024-01-31 ENCOUNTER — OUTPATIENT (OUTPATIENT)
Dept: OUTPATIENT SERVICES | Facility: HOSPITAL | Age: 49
LOS: 1 days | End: 2024-01-31
Payer: COMMERCIAL

## 2024-01-31 ENCOUNTER — APPOINTMENT (OUTPATIENT)
Dept: ULTRASOUND IMAGING | Facility: CLINIC | Age: 49
End: 2024-01-31
Payer: COMMERCIAL

## 2024-01-31 DIAGNOSIS — C50.912 MALIGNANT NEOPLASM OF UNSPECIFIED SITE OF LEFT FEMALE BREAST: ICD-10-CM

## 2024-01-31 DIAGNOSIS — N84.0 POLYP OF CORPUS UTERI: Chronic | ICD-10-CM

## 2024-01-31 DIAGNOSIS — Z98.890 OTHER SPECIFIED POSTPROCEDURAL STATES: Chronic | ICD-10-CM

## 2024-01-31 DIAGNOSIS — Z98.49 CATARACT EXTRACTION STATUS, UNSPECIFIED EYE: Chronic | ICD-10-CM

## 2024-01-31 PROCEDURE — 76536 US EXAM OF HEAD AND NECK: CPT | Mod: 26

## 2024-01-31 PROCEDURE — 76536 US EXAM OF HEAD AND NECK: CPT

## 2024-02-27 ENCOUNTER — OUTPATIENT (OUTPATIENT)
Dept: OUTPATIENT SERVICES | Facility: HOSPITAL | Age: 49
LOS: 1 days | End: 2024-02-27
Payer: COMMERCIAL

## 2024-02-27 ENCOUNTER — APPOINTMENT (OUTPATIENT)
Dept: MRI IMAGING | Facility: CLINIC | Age: 49
End: 2024-02-27
Payer: COMMERCIAL

## 2024-02-27 DIAGNOSIS — Z98.49 CATARACT EXTRACTION STATUS, UNSPECIFIED EYE: Chronic | ICD-10-CM

## 2024-02-27 DIAGNOSIS — N84.0 POLYP OF CORPUS UTERI: Chronic | ICD-10-CM

## 2024-02-27 DIAGNOSIS — Z98.890 OTHER SPECIFIED POSTPROCEDURAL STATES: Chronic | ICD-10-CM

## 2024-02-27 DIAGNOSIS — Z00.8 ENCOUNTER FOR OTHER GENERAL EXAMINATION: ICD-10-CM

## 2024-02-27 PROCEDURE — C8908: CPT

## 2024-02-27 PROCEDURE — A9585: CPT

## 2024-02-27 PROCEDURE — 77049 MRI BREAST C-+ W/CAD BI: CPT | Mod: 26

## 2024-02-27 PROCEDURE — C8937: CPT

## 2024-04-03 ENCOUNTER — APPOINTMENT (OUTPATIENT)
Dept: BARIATRICS/WEIGHT MGMT | Facility: CLINIC | Age: 49
End: 2024-04-03

## 2024-05-21 ENCOUNTER — TRANSCRIPTION ENCOUNTER (OUTPATIENT)
Age: 49
End: 2024-05-21

## 2024-05-21 RX ORDER — GABAPENTIN 300 MG/1
300 CAPSULE ORAL TWICE DAILY
Qty: 180 | Refills: 0 | Status: ACTIVE | COMMUNITY
Start: 2024-05-21 | End: 1900-01-01

## 2024-07-03 ENCOUNTER — RESULT REVIEW (OUTPATIENT)
Age: 49
End: 2024-07-03

## 2024-07-10 ENCOUNTER — APPOINTMENT (OUTPATIENT)
Dept: BARIATRICS/WEIGHT MGMT | Facility: CLINIC | Age: 49
End: 2024-07-10
Payer: COMMERCIAL

## 2024-07-10 VITALS — BODY MASS INDEX: 27.46 KG/M2 | WEIGHT: 155 LBS

## 2024-07-10 DIAGNOSIS — E66.3 OVERWEIGHT: ICD-10-CM

## 2024-07-10 DIAGNOSIS — E78.5 HYPERLIPIDEMIA, UNSPECIFIED: ICD-10-CM

## 2024-07-10 PROCEDURE — 99214 OFFICE O/P EST MOD 30 MIN: CPT

## 2024-07-10 RX ORDER — TOPIRAMATE 25 MG/1
25 TABLET, FILM COATED ORAL
Qty: 30 | Refills: 5 | Status: ACTIVE | COMMUNITY
Start: 2024-07-10 | End: 1900-01-01

## 2024-07-10 RX ORDER — PHENTERMINE HYDROCHLORIDE 37.5 MG/1
37.5 TABLET ORAL
Qty: 15 | Refills: 5 | Status: ACTIVE | COMMUNITY
Start: 2024-07-10 | End: 1900-01-01

## 2024-07-11 ENCOUNTER — TRANSCRIPTION ENCOUNTER (OUTPATIENT)
Age: 49
End: 2024-07-11

## 2024-07-22 ENCOUNTER — TRANSCRIPTION ENCOUNTER (OUTPATIENT)
Age: 49
End: 2024-07-22

## 2024-07-26 ENCOUNTER — OUTPATIENT (OUTPATIENT)
Dept: OUTPATIENT SERVICES | Facility: HOSPITAL | Age: 49
LOS: 1 days | Discharge: HOME | End: 2024-07-26

## 2024-07-26 DIAGNOSIS — Z98.49 CATARACT EXTRACTION STATUS, UNSPECIFIED EYE: Chronic | ICD-10-CM

## 2024-07-26 DIAGNOSIS — Z98.890 OTHER SPECIFIED POSTPROCEDURAL STATES: Chronic | ICD-10-CM

## 2024-07-26 DIAGNOSIS — N84.0 POLYP OF CORPUS UTERI: Chronic | ICD-10-CM

## 2024-07-26 DIAGNOSIS — C50.919 MALIGNANT NEOPLASM OF UNSPECIFIED SITE OF UNSPECIFIED FEMALE BREAST: ICD-10-CM

## 2024-08-01 ENCOUNTER — NON-APPOINTMENT (OUTPATIENT)
Age: 49
End: 2024-08-01

## 2024-08-02 ENCOUNTER — APPOINTMENT (OUTPATIENT)
Dept: HEMATOLOGY ONCOLOGY | Facility: CLINIC | Age: 49
End: 2024-08-02
Payer: COMMERCIAL

## 2024-08-02 VITALS
RESPIRATION RATE: 15 BRPM | TEMPERATURE: 98.4 F | WEIGHT: 148 LBS | OXYGEN SATURATION: 97 % | DIASTOLIC BLOOD PRESSURE: 88 MMHG | HEART RATE: 86 BPM | SYSTOLIC BLOOD PRESSURE: 131 MMHG | BODY MASS INDEX: 26.22 KG/M2

## 2024-08-02 DIAGNOSIS — Z79.810 LONG TERM (CURRENT) USE OF SELECTIVE ESTROGEN RECEPTOR MODULATORS (SERMS): ICD-10-CM

## 2024-08-02 DIAGNOSIS — R23.2 FLUSHING: ICD-10-CM

## 2024-08-02 DIAGNOSIS — T45.1X5A FLUSHING: ICD-10-CM

## 2024-08-02 DIAGNOSIS — C50.912 MALIGNANT NEOPLASM OF UNSPECIFIED SITE OF LEFT FEMALE BREAST: ICD-10-CM

## 2024-08-02 PROCEDURE — 99214 OFFICE O/P EST MOD 30 MIN: CPT

## 2024-08-02 PROCEDURE — G2211 COMPLEX E/M VISIT ADD ON: CPT

## 2024-08-02 NOTE — END OF VISIT
[] : Fellow [FreeTextEntry3] : I was present with the fellow for the entire visit including history taking, exam, assessment and plan.

## 2024-08-02 NOTE — PHYSICAL EXAM
[Fully active, able to carry on all pre-disease performance without restriction] : Status 0 - Fully active, able to carry on all pre-disease performance without restriction [Normal] : affect appropriate [de-identified] : healed left breast scar

## 2024-08-02 NOTE — HISTORY OF PRESENT ILLNESS
[T: ___] : T[unfilled] [N: ___] : N[unfilled] [AJCC Stage: ____] : AJCC Stage: [unfilled] [de-identified] : Ms. Macie Beatty is a 46 year old female here for an evaluation of breast cancer. Her oncologic history is as follows:  She underwent routine breast imaging on 8/17/21(BIRADS 0) which showed a new heterogeneous mass of the left breast at 5:00 6 cm FN for which targetd left breast US is recommended.On 8/20/21 (BIRADS 4B) she underwent a left breast US which revealed indeterminate subcentimeter mass at the left 5:00 6 cm FN for which US guided core biopsy is rec, She underwent left breast 5 o'clock 6 cm FN core biopsy on 8/27/21 which showed invasive moderately-differentiated ductal carcinoma with lobular features, [place] score 6/9, measuring 0.5 cm, ER + 90%,MO + 90% HER-2/peterson negative, intermediate grade solid type DCIS with cancerization of lobules. No lymphovascular permeation noted.  She underwent a breast MRI on 9/15/21 (BIRADS 6) which showed 4mm enhancing focus slightly medial anterior to the clip at the site of biopsy proven malignancy in the left breast at the 5:00 position. No significant lymphadenopathy.   She underwent left lower outer quadrant excision with SLNB on 10/6/21 which revealed  multifocal invasive lobular carcinoma, [place]abil[place] grade 3,score 6/9, with approximately 6 different foci with measurements ranging from 1.0 to 4.5 mm,  margins were negative, No lymphovascular invasion noted, Lobular carcinoma in situ, classical type, with focal collagenous spherulosis which focally involves an intraductal papilloma noted. Complex sclerosing lesions, Intraductal papillomata, fibrocystic changes, usual ductal hyperplasia, apocrine metaplasia, benign epithelial calcifications and are pseudoangiomatous stromal hyperplasia also noted. No DCIS noted  Two sentinel lymph node were removed and were negative(0/2) for metastasis.  11/1/21 ONCOTYPE score 13  Opthal: Alvaro Murray [facility] [place] Cataract surgery in 2019, 2020  Dr Yvonne Martin : GYN TVS 9/2021 normal  3/2022: She had hot flashes and night sweats and called 3/2/22, effexor was prescribed.  She reports she is usually not an anxious person. She has noticed mild anxiety with the dx and treatment but worsened last week since she started effexor. Rec to stop effexor Hot flashes kept her up all night and that affected her day time performance Rec to try gabapentin 300 hs She denies mood changes, wt gain, vaginal dryness, SOB, chest pain, leg swelling or clotting issues. Her energy and appetite is good, wt stable. Eating healthy  She took Rybelsus x 10 days 6/2022, had severe GI s/e. Stopped Rybelsus. S/p EGD/colonoscopy/CT- all good. Sx resolved.  [de-identified] : In summary, Ms. MERYL REYNA is a 46 year old premenopausal female with stage 1A  ( mpT1a,N0(sn) Mn/a) ER positive, GA positive, HER-2/peterson negative invasive moderately-differentiated carcinoma with lobular features of the left breast. She is s/p lumpectomy on 10/6/21. ODX 13. She has h/o early onset cataracts and uterine polyps. RT completed 1/7/22, tamoxifen started 1/21/22  She has insomnia, melatonin didn't help.  Now on trazodone. She started gabapentin 300 hs 3/2022 for hot flashes, helping. Also on acupuncture She is taking tamoxifen with good compliance. She denies mood changes, wt gain, vaginal dryness, SOB, chest pain, leg swelling or clotting issues. Her energy and appetite is good, wt stable.  She is now on ozempic x 5/2023, lost 6 lbs.  LMP date: 11/2022, periods irregular since she started tamoxifen. FSH 23 1/2024 but 33/7/2024. Discussed to continue ward for now. Recheck FSH next visit 2/2025. If FSH> 25 consistently x 2, would discuss AI  GYN - Dr Madrigal saw 7/2023 Breast imaging: with [facility], 8/2022, 9/2023 Opthal: annual f/u, Dr Murray  8/2/24: compliant w/ Tamoxifen but makes her fatigued, insomnia. Takes oxybutynin for hot flashes and is helping.. Had tried several medications in the past including Trazodone but has not worked. No joint pain, has good appetite, stable weight, no blood clots, no vaginal bleeding Stopped Ozempic for weight loss due to side effects. Had a positive PAP and underwent colposcopy recently. Pending mammogram next week. Recent blood work showed 300 fasting triglycerides.

## 2024-08-02 NOTE — PHYSICAL EXAM
[Fully active, able to carry on all pre-disease performance without restriction] : Status 0 - Fully active, able to carry on all pre-disease performance without restriction [Normal] : affect appropriate [de-identified] : healed left breast scar

## 2024-08-02 NOTE — HISTORY OF PRESENT ILLNESS
[T: ___] : T[unfilled] [N: ___] : N[unfilled] [AJCC Stage: ____] : AJCC Stage: [unfilled] [de-identified] : Ms. Macie Beatty is a 46 year old female here for an evaluation of breast cancer. Her oncologic history is as follows:  She underwent routine breast imaging on 8/17/21(BIRADS 0) which showed a new heterogeneous mass of the left breast at 5:00 6 cm FN for which targetd left breast US is recommended.On 8/20/21 (BIRADS 4B) she underwent a left breast US which revealed indeterminate subcentimeter mass at the left 5:00 6 cm FN for which US guided core biopsy is rec, She underwent left breast 5 o'clock 6 cm FN core biopsy on 8/27/21 which showed invasive moderately-differentiated ductal carcinoma with lobular features, [place] score 6/9, measuring 0.5 cm, ER + 90%,DC + 90% HER-2/peterson negative, intermediate grade solid type DCIS with cancerization of lobules. No lymphovascular permeation noted.  She underwent a breast MRI on 9/15/21 (BIRADS 6) which showed 4mm enhancing focus slightly medial anterior to the clip at the site of biopsy proven malignancy in the left breast at the 5:00 position. No significant lymphadenopathy.   She underwent left lower outer quadrant excision with SLNB on 10/6/21 which revealed  multifocal invasive lobular carcinoma, Nabil[place] grade 3,score 6/9, with approximately 6 different foci with measurements ranging from 1.0 to 4.5 mm,  margins were negative, No lymphovascular invasion noted, Lobular carcinoma in situ, classical type, with focal collagenous spherulosis which focally involves an intraductal papilloma noted. Complex sclerosing lesions, Intraductal papillomata, fibrocystic changes, usual ductal hyperplasia, apocrine metaplasia, benign epithelial calcifications and are pseudoangiomatous stromal hyperplasia also noted. No DCIS noted  Two sentinel lymph node were removed and were negative(0/2) for metastasis.  11/1/21 ONCOTYPE score 13  Opthal: Alvaro Murray [facility] [place] Cataract surgery in 2019, 2020  Dr Yvonne Martin : GYN TVS 9/2021 normal  3/2022: She had hot flashes and night sweats and called 3/2/22, effexor was prescribed.  She reports she is usually not an anxious person. She has noticed mild anxiety with the dx and treatment but worsened last week since she started effexor. Rec to stop effexor Hot flashes kept her up all night and that affected her day time performance Rec to try gabapentin 300 hs She denies mood changes, wt gain, vaginal dryness, SOB, chest pain, leg swelling or clotting issues. Her energy and appetite is good, wt stable. Eating healthy  She took Rybelsus x 10 days 6/2022, had severe GI s/e. Stopped Rybelsus. S/p EGD/colonoscopy/CT- all good. Sx resolved.  [de-identified] : In summary, Ms. MERYL REYNA is a 46 year old premenopausal female with stage 1A  ( mpT1a,N0(sn) Mn/a) ER positive, AL positive, HER-2/peterson negative invasive moderately-differentiated carcinoma with lobular features of the left breast. She is s/p lumpectomy on 10/6/21. ODX 13. She has h/o early onset cataracts and uterine polyps. RT completed 1/7/22, tamoxifen started 1/21/22  She has insomnia, melatonin didn't help.  Now on trazodone. She started gabapentin 300 hs 3/2022 for hot flashes, helping. Also on acupuncture She is taking tamoxifen with good compliance. She denies mood changes, wt gain, vaginal dryness, SOB, chest pain, leg swelling or clotting issues. Her energy and appetite is good, wt stable.  She is now on ozempic x 5/2023, lost 6 lbs.  LMP date: 11/2022, periods irregular since she started tamoxifen. FSH 23 1/2024 but 33/7/2024. Discussed to continue ward for now. Recheck FSH next visit 2/2025. If FSH> 25 consistently x 2, would discuss AI  GYN - Dr Madrigal saw 7/2023 Breast imaging: with [facility], 8/2022, 9/2023 Opthal: annual f/u, Dr Murray  8/2/24: compliant w/ Tamoxifen but makes her fatigued, insomnia. Takes oxybutynin for hot flashes and is helping.. Had tried several medications in the past including Trazodone but has not worked. No joint pain, has good appetite, stable weight, no blood clots, no vaginal bleeding Stopped Ozempic for weight loss due to side effects. Had a positive PAP and underwent colposcopy recently. Pending mammogram next week. Recent blood work showed 300 fasting triglycerides.

## 2024-08-02 NOTE — REASON FOR VISIT
[Follow-Up Visit] : a follow-up [Other: _____] : [unfilled] [FreeTextEntry2] :  invasive moderately-differentiated ductal carcinoma of the left breast.

## 2024-08-02 NOTE — ASSESSMENT
[FreeTextEntry1] : In summary, Ms. MERYL REYNA is a 46 year old premenopausal female with stage 1A ( mpT1a,N0(sn) Mn/a) ER positive, OK positive, HER-2/peterson negative invasive moderately-differentiated carcinoma with lobular features of the left breast. She is s/p lumpectomy on 10/6/21. ODX 13. She has h/o early onset cataracts and uterine polyps. RT completed 1/7/22, tamoxifen started 1/21/22  - Breast ca: She is tolerating tamoxifen (since Jan 2022) with expected side effects. Reports good compliance. Plan to continue tamoxifen for 10 years. Will switch to AI after menopause. GYN and opthal f/u annually due to risk of endometrial ca and cataracts respectively. Up to date with Breast imaging LMP date: 11/2022 LMP date: 11/2022, periods irregular since she started tamoxifen. FSH 23 1/2024 but 33/7/2024. Discussed to continue ward for now. Recheck FSH next visit 2/2025. If FSH> 25 consistently x 2, would discuss AI  - Hot flashes: 2/2 tamoxifen. Advised to wear layers and use fan prn. Continue gabapentin - Wt gain: Life style changes d/w her. continue ozempic - Patient is aware of signs and symptoms of DVT/PE. Patient will report if she develops acute onset chest pain shortness of breath, leg swelling or calf pain. - Continue asa 81 - She has insomnia, melatonin, trazodone didn't help.  Stopped gabapentin.. Also on acupuncture - will continue on Tamoxifen for time being and repeat FSH levels in 6 months (FSH 8/2024 is 33)  RTC in 6 months

## 2024-08-02 NOTE — BEGINNING OF VISIT
[0] : 2) Feeling down, depressed, or hopeless: Not at all (0) [PHQ-2 Negative] : PHQ-2 Negative [Pain Scale: ___] : On a scale of 1-10, today the patient's pain is a(n) [unfilled]. [[id number]] : 0 [Never] : Never [Date Discussed (MM/DD/YY): ___] : Discussed: [unfilled] [With Patient/Caregiver] : with Patient/Caregiver

## 2024-08-02 NOTE — CONSULT LETTER
[Dear  ___] : Dear  [unfilled], [Consult Letter:] : I had the pleasure of evaluating your patient, [unfilled]. [Please see my note below.] : Please see my note below. [Consult Closing:] : Thank you very much for allowing me to participate in the care of this patient.  If you have any questions, please do not hesitate to contact me. [Sincerely,] : Sincerely, [DrBoom  ___] : Dr. PABLO [DrBoom ___] : Dr. PABLO [FreeTextEntry2] : Terrell Orosco Beg [FreeTextEntry3] : Otilia Salazar MD\par  Division of Medical Oncology and Hematology\par  [facility]\par  [employer] [facility]\par  [place], [place]\par

## 2024-08-02 NOTE — BEGINNING OF VISIT
[0] : 2) Feeling down, depressed, or hopeless: Not at all (0) [PHQ-2 Negative] : PHQ-2 Negative [[id number]] : 0 [Pain Scale: ___] : On a scale of 1-10, today the patient's pain is a(n) [unfilled]. [Never] : Never [Date Discussed (MM/DD/YY): ___] : Discussed: [unfilled] [With Patient/Caregiver] : with Patient/Caregiver

## 2024-08-02 NOTE — ASSESSMENT
[FreeTextEntry1] : In summary, Ms. MERYL REYNA is a 46 year old premenopausal female with stage 1A ( mpT1a,N0(sn) Mn/a) ER positive, MS positive, HER-2/peterson negative invasive moderately-differentiated carcinoma with lobular features of the left breast. She is s/p lumpectomy on 10/6/21. ODX 13. She has h/o early onset cataracts and uterine polyps. RT completed 1/7/22, tamoxifen started 1/21/22  - Breast ca: She is tolerating tamoxifen (since Jan 2022) with expected side effects. Reports good compliance. Plan to continue tamoxifen for 10 years. Will switch to AI after menopause. GYN and opthal f/u annually due to risk of endometrial ca and cataracts respectively. Up to date with Breast imaging LMP date: 11/2022 LMP date: 11/2022, periods irregular since she started tamoxifen. FSH 23 1/2024 but 33/7/2024. Discussed to continue ward for now. Recheck FSH next visit 2/2025. If FSH> 25 consistently x 2, would discuss AI  - Hot flashes: 2/2 tamoxifen. Advised to wear layers and use fan prn. Continue gabapentin - Wt gain: Life style changes d/w her. continue ozempic - Patient is aware of signs and symptoms of DVT/PE. Patient will report if she develops acute onset chest pain shortness of breath, leg swelling or calf pain. - Continue asa 81 - She has insomnia, melatonin, trazodone didn't help.  Stopped gabapentin.. Also on acupuncture - will continue on Tamoxifen for time being and repeat FSH levels in 6 months (FSH 8/2024 is 33)  RTC in 6 months

## 2024-08-02 NOTE — CONSULT LETTER
[Dear  ___] : Dear  [unfilled], [Consult Letter:] : I had the pleasure of evaluating your patient, [unfilled]. [Please see my note below.] : Please see my note below. [Consult Closing:] : Thank you very much for allowing me to participate in the care of this patient.  If you have any questions, please do not hesitate to contact me. [Sincerely,] : Sincerely, [DrBoom  ___] : Dr. PABLO [DrBoom ___] : Dr. PABLO [FreeTextEntry2] : Terrell Orosco Beg [FreeTextEntry3] : Otilia Salazar MD\par  Division of Medical Oncology and Hematology\par  [facility] [place]\par  [employer] [facility]\par  [place], [place]\par

## 2024-08-05 ENCOUNTER — NON-APPOINTMENT (OUTPATIENT)
Age: 49
End: 2024-08-05

## 2024-08-09 ENCOUNTER — TRANSCRIPTION ENCOUNTER (OUTPATIENT)
Age: 49
End: 2024-08-09

## 2024-09-12 ENCOUNTER — TRANSCRIPTION ENCOUNTER (OUTPATIENT)
Age: 49
End: 2024-09-12

## 2024-09-16 ENCOUNTER — TRANSCRIPTION ENCOUNTER (OUTPATIENT)
Age: 49
End: 2024-09-16

## 2024-09-16 DIAGNOSIS — Z79.810 LONG TERM (CURRENT) USE OF SELECTIVE ESTROGEN RECEPTOR MODULATORS (SERMS): ICD-10-CM

## 2024-10-18 ENCOUNTER — TRANSCRIPTION ENCOUNTER (OUTPATIENT)
Age: 49
End: 2024-10-18

## 2024-10-24 ENCOUNTER — APPOINTMENT (OUTPATIENT)
Dept: SURGICAL ONCOLOGY | Facility: CLINIC | Age: 49
End: 2024-10-24
Payer: COMMERCIAL

## 2024-10-24 ENCOUNTER — NON-APPOINTMENT (OUTPATIENT)
Age: 49
End: 2024-10-24

## 2024-10-24 VITALS
RESPIRATION RATE: 17 BRPM | DIASTOLIC BLOOD PRESSURE: 86 MMHG | HEART RATE: 89 BPM | HEIGHT: 63 IN | WEIGHT: 146 LBS | SYSTOLIC BLOOD PRESSURE: 138 MMHG | OXYGEN SATURATION: 98 % | BODY MASS INDEX: 25.87 KG/M2

## 2024-10-24 DIAGNOSIS — C50.512 MALIGNANT NEOPLASM OF LOWER-OUTER QUADRANT OF LEFT FEMALE BREAST: ICD-10-CM

## 2024-10-24 DIAGNOSIS — Z17.0 MALIGNANT NEOPLASM OF LOWER-OUTER QUADRANT OF LEFT FEMALE BREAST: ICD-10-CM

## 2024-10-24 PROCEDURE — 99215 OFFICE O/P EST HI 40 MIN: CPT

## 2024-10-30 ENCOUNTER — APPOINTMENT (OUTPATIENT)
Dept: BARIATRICS/WEIGHT MGMT | Facility: CLINIC | Age: 49
End: 2024-10-30
Payer: COMMERCIAL

## 2024-10-30 ENCOUNTER — OUTPATIENT (OUTPATIENT)
Dept: OUTPATIENT SERVICES | Facility: HOSPITAL | Age: 49
LOS: 1 days | End: 2024-10-30
Payer: COMMERCIAL

## 2024-10-30 DIAGNOSIS — I10 ESSENTIAL (PRIMARY) HYPERTENSION: ICD-10-CM

## 2024-10-30 DIAGNOSIS — Z98.890 OTHER SPECIFIED POSTPROCEDURAL STATES: Chronic | ICD-10-CM

## 2024-10-30 DIAGNOSIS — E66.3 OVERWEIGHT: ICD-10-CM

## 2024-10-30 DIAGNOSIS — Z98.49 CATARACT EXTRACTION STATUS, UNSPECIFIED EYE: Chronic | ICD-10-CM

## 2024-10-30 DIAGNOSIS — N84.0 POLYP OF CORPUS UTERI: Chronic | ICD-10-CM

## 2024-10-30 PROCEDURE — G0463: CPT

## 2024-10-30 PROCEDURE — 99214 OFFICE O/P EST MOD 30 MIN: CPT | Mod: 95

## 2024-10-31 VITALS — BODY MASS INDEX: 27.1 KG/M2 | WEIGHT: 153 LBS

## 2024-10-31 DIAGNOSIS — C50.912 MALIGNANT NEOPLASM OF UNSPECIFIED SITE OF LEFT FEMALE BREAST: ICD-10-CM

## 2024-11-01 ENCOUNTER — APPOINTMENT (OUTPATIENT)
Dept: CT IMAGING | Facility: CLINIC | Age: 49
End: 2024-11-01

## 2024-11-01 ENCOUNTER — TRANSCRIPTION ENCOUNTER (OUTPATIENT)
Age: 49
End: 2024-11-01

## 2024-11-01 ENCOUNTER — OUTPATIENT (OUTPATIENT)
Dept: OUTPATIENT SERVICES | Facility: HOSPITAL | Age: 49
LOS: 1 days | End: 2024-11-01
Payer: COMMERCIAL

## 2024-11-01 DIAGNOSIS — E78.5 HYPERLIPIDEMIA, UNSPECIFIED: ICD-10-CM

## 2024-11-01 DIAGNOSIS — Z98.890 OTHER SPECIFIED POSTPROCEDURAL STATES: Chronic | ICD-10-CM

## 2024-11-01 DIAGNOSIS — Z00.8 ENCOUNTER FOR OTHER GENERAL EXAMINATION: ICD-10-CM

## 2024-11-01 DIAGNOSIS — N84.0 POLYP OF CORPUS UTERI: Chronic | ICD-10-CM

## 2024-11-01 DIAGNOSIS — Z98.49 CATARACT EXTRACTION STATUS, UNSPECIFIED EYE: Chronic | ICD-10-CM

## 2024-11-01 PROCEDURE — 74160 CT ABDOMEN W/CONTRAST: CPT | Mod: 26

## 2024-11-01 PROCEDURE — 74160 CT ABDOMEN W/CONTRAST: CPT

## 2024-11-05 DIAGNOSIS — E66.3 OVERWEIGHT: ICD-10-CM

## 2024-11-05 DIAGNOSIS — E78.5 HYPERLIPIDEMIA, UNSPECIFIED: ICD-10-CM

## 2024-11-05 RX ORDER — TIRZEPATIDE 2.5 MG/.5ML
2.5 INJECTION, SOLUTION SUBCUTANEOUS
Qty: 1 | Refills: 5 | Status: ACTIVE | COMMUNITY
Start: 2024-10-31 | End: 1900-01-01

## 2024-11-26 ENCOUNTER — NON-APPOINTMENT (OUTPATIENT)
Age: 49
End: 2024-11-26

## 2024-12-18 ENCOUNTER — TRANSCRIPTION ENCOUNTER (OUTPATIENT)
Age: 49
End: 2024-12-18

## 2025-01-16 ENCOUNTER — RX RENEWAL (OUTPATIENT)
Age: 50
End: 2025-01-16

## 2025-01-29 ENCOUNTER — RX RENEWAL (OUTPATIENT)
Age: 50
End: 2025-01-29

## 2025-02-04 ENCOUNTER — OUTPATIENT (OUTPATIENT)
Dept: OUTPATIENT SERVICES | Facility: HOSPITAL | Age: 50
LOS: 1 days | Discharge: HOME | End: 2025-02-04

## 2025-02-04 DIAGNOSIS — Z98.890 OTHER SPECIFIED POSTPROCEDURAL STATES: Chronic | ICD-10-CM

## 2025-02-04 DIAGNOSIS — C50.912 MALIGNANT NEOPLASM OF UNSPECIFIED SITE OF LEFT FEMALE BREAST: ICD-10-CM

## 2025-02-04 DIAGNOSIS — N84.0 POLYP OF CORPUS UTERI: Chronic | ICD-10-CM

## 2025-02-04 DIAGNOSIS — Z98.49 CATARACT EXTRACTION STATUS, UNSPECIFIED EYE: Chronic | ICD-10-CM

## 2025-02-05 ENCOUNTER — RESULT REVIEW (OUTPATIENT)
Age: 50
End: 2025-02-05

## 2025-02-05 ENCOUNTER — APPOINTMENT (OUTPATIENT)
Dept: HEMATOLOGY ONCOLOGY | Facility: CLINIC | Age: 50
End: 2025-02-05
Payer: COMMERCIAL

## 2025-02-05 ENCOUNTER — NON-APPOINTMENT (OUTPATIENT)
Age: 50
End: 2025-02-05

## 2025-02-05 VITALS
DIASTOLIC BLOOD PRESSURE: 85 MMHG | SYSTOLIC BLOOD PRESSURE: 130 MMHG | WEIGHT: 141 LBS | BODY MASS INDEX: 24.98 KG/M2 | OXYGEN SATURATION: 99 % | HEART RATE: 93 BPM | TEMPERATURE: 97.8 F | RESPIRATION RATE: 17 BRPM

## 2025-02-05 DIAGNOSIS — Z79.810 LONG TERM (CURRENT) USE OF SELECTIVE ESTROGEN RECEPTOR MODULATORS (SERMS): ICD-10-CM

## 2025-02-05 DIAGNOSIS — R23.2 FLUSHING: ICD-10-CM

## 2025-02-05 DIAGNOSIS — T45.1X5A FLUSHING: ICD-10-CM

## 2025-02-05 DIAGNOSIS — C50.912 MALIGNANT NEOPLASM OF UNSPECIFIED SITE OF LEFT FEMALE BREAST: ICD-10-CM

## 2025-02-05 LAB
BASOPHILS # BLD AUTO: 0.07 K/UL — SIGNIFICANT CHANGE UP (ref 0–0.2)
BASOPHILS NFR BLD AUTO: 1 % — SIGNIFICANT CHANGE UP (ref 0–2)
EOSINOPHIL # BLD AUTO: 0.15 K/UL — SIGNIFICANT CHANGE UP (ref 0–0.5)
EOSINOPHIL NFR BLD AUTO: 2.1 % — SIGNIFICANT CHANGE UP (ref 0–6)
HCT VFR BLD CALC: 35.9 % — SIGNIFICANT CHANGE UP (ref 34.5–45)
HGB BLD-MCNC: 12.1 G/DL — SIGNIFICANT CHANGE UP (ref 11.5–15.5)
IMM GRANULOCYTES NFR BLD AUTO: 0.1 % — SIGNIFICANT CHANGE UP (ref 0–0.9)
LYMPHOCYTES # BLD AUTO: 2.23 K/UL — SIGNIFICANT CHANGE UP (ref 1–3.3)
LYMPHOCYTES # BLD AUTO: 30.7 % — SIGNIFICANT CHANGE UP (ref 13–44)
MCHC RBC-ENTMCNC: 31.2 PG — SIGNIFICANT CHANGE UP (ref 27–34)
MCHC RBC-ENTMCNC: 33.7 G/DL — SIGNIFICANT CHANGE UP (ref 32–36)
MCV RBC AUTO: 92.5 FL — SIGNIFICANT CHANGE UP (ref 80–100)
MONOCYTES # BLD AUTO: 0.52 K/UL — SIGNIFICANT CHANGE UP (ref 0–0.9)
MONOCYTES NFR BLD AUTO: 7.2 % — SIGNIFICANT CHANGE UP (ref 2–14)
NEUTROPHILS # BLD AUTO: 4.29 K/UL — SIGNIFICANT CHANGE UP (ref 1.8–7.4)
NEUTROPHILS NFR BLD AUTO: 58.9 % — SIGNIFICANT CHANGE UP (ref 43–77)
NRBC # BLD: 0 /100 WBCS — SIGNIFICANT CHANGE UP (ref 0–0)
NRBC BLD-RTO: 0 /100 WBCS — SIGNIFICANT CHANGE UP (ref 0–0)
PLATELET # BLD AUTO: 250 K/UL — SIGNIFICANT CHANGE UP (ref 150–400)
RBC # BLD: 3.88 M/UL — SIGNIFICANT CHANGE UP (ref 3.8–5.2)
RBC # FLD: 12.6 % — SIGNIFICANT CHANGE UP (ref 10.3–14.5)
WBC # BLD: 7.27 K/UL — SIGNIFICANT CHANGE UP (ref 3.8–10.5)
WBC # FLD AUTO: 7.27 K/UL — SIGNIFICANT CHANGE UP (ref 3.8–10.5)

## 2025-02-05 PROCEDURE — 99214 OFFICE O/P EST MOD 30 MIN: CPT

## 2025-02-05 PROCEDURE — G2211 COMPLEX E/M VISIT ADD ON: CPT | Mod: NC

## 2025-02-06 LAB
ALBUMIN SERPL ELPH-MCNC: 4.5 G/DL
ALP BLD-CCNC: 64 U/L
ALT SERPL-CCNC: 10 U/L
ANION GAP SERPL CALC-SCNC: 13 MMOL/L
AST SERPL-CCNC: 17 U/L
BILIRUB SERPL-MCNC: 0.3 MG/DL
BUN SERPL-MCNC: 13 MG/DL
CALCIUM SERPL-MCNC: 9.3 MG/DL
CHLORIDE SERPL-SCNC: 103 MMOL/L
CO2 SERPL-SCNC: 22 MMOL/L
CREAT SERPL-MCNC: 1.01 MG/DL
EGFR: 68 ML/MIN/1.73M2
ESTRADIOL SERPL-MCNC: 10 PG/ML
FSH SERPL-MCNC: 11.2 IU/L
GLUCOSE SERPL-MCNC: 111 MG/DL
POTASSIUM SERPL-SCNC: 4.1 MMOL/L
PROT SERPL-MCNC: 6.8 G/DL
SODIUM SERPL-SCNC: 137 MMOL/L

## 2025-02-10 ENCOUNTER — TRANSCRIPTION ENCOUNTER (OUTPATIENT)
Age: 50
End: 2025-02-10

## 2025-02-25 ENCOUNTER — APPOINTMENT (OUTPATIENT)
Dept: MRI IMAGING | Facility: CLINIC | Age: 50
End: 2025-02-25
Payer: COMMERCIAL

## 2025-02-25 ENCOUNTER — OUTPATIENT (OUTPATIENT)
Dept: OUTPATIENT SERVICES | Facility: HOSPITAL | Age: 50
LOS: 1 days | End: 2025-02-25
Payer: COMMERCIAL

## 2025-02-25 DIAGNOSIS — Z98.49 CATARACT EXTRACTION STATUS, UNSPECIFIED EYE: Chronic | ICD-10-CM

## 2025-02-25 DIAGNOSIS — N84.0 POLYP OF CORPUS UTERI: Chronic | ICD-10-CM

## 2025-02-25 DIAGNOSIS — Z98.890 OTHER SPECIFIED POSTPROCEDURAL STATES: Chronic | ICD-10-CM

## 2025-02-25 DIAGNOSIS — Z00.8 ENCOUNTER FOR OTHER GENERAL EXAMINATION: ICD-10-CM

## 2025-02-25 PROCEDURE — A9585: CPT

## 2025-02-25 PROCEDURE — C8937: CPT

## 2025-02-25 PROCEDURE — C8908: CPT

## 2025-02-25 PROCEDURE — 77049 MRI BREAST C-+ W/CAD BI: CPT | Mod: 26

## 2025-03-11 ENCOUNTER — NON-APPOINTMENT (OUTPATIENT)
Age: 50
End: 2025-03-11

## 2025-04-15 ENCOUNTER — TRANSCRIPTION ENCOUNTER (OUTPATIENT)
Age: 50
End: 2025-04-15

## 2025-04-16 ENCOUNTER — TRANSCRIPTION ENCOUNTER (OUTPATIENT)
Age: 50
End: 2025-04-16

## 2025-04-30 ENCOUNTER — APPOINTMENT (OUTPATIENT)
Dept: BARIATRICS/WEIGHT MGMT | Facility: CLINIC | Age: 50
End: 2025-04-30
Payer: COMMERCIAL

## 2025-04-30 ENCOUNTER — OUTPATIENT (OUTPATIENT)
Dept: OUTPATIENT SERVICES | Facility: HOSPITAL | Age: 50
LOS: 1 days | End: 2025-04-30

## 2025-04-30 VITALS — BODY MASS INDEX: 24.09 KG/M2 | WEIGHT: 136 LBS

## 2025-04-30 DIAGNOSIS — E78.5 HYPERLIPIDEMIA, UNSPECIFIED: ICD-10-CM

## 2025-04-30 DIAGNOSIS — N84.0 POLYP OF CORPUS UTERI: Chronic | ICD-10-CM

## 2025-04-30 DIAGNOSIS — Z98.890 OTHER SPECIFIED POSTPROCEDURAL STATES: Chronic | ICD-10-CM

## 2025-04-30 DIAGNOSIS — I10 ESSENTIAL (PRIMARY) HYPERTENSION: ICD-10-CM

## 2025-04-30 DIAGNOSIS — E66.3 OVERWEIGHT: ICD-10-CM

## 2025-04-30 DIAGNOSIS — Z98.49 CATARACT EXTRACTION STATUS, UNSPECIFIED EYE: Chronic | ICD-10-CM

## 2025-04-30 PROCEDURE — 99213 OFFICE O/P EST LOW 20 MIN: CPT | Mod: 95

## 2025-05-01 ENCOUNTER — TRANSCRIPTION ENCOUNTER (OUTPATIENT)
Age: 50
End: 2025-05-01

## 2025-05-05 DIAGNOSIS — E66.3 OVERWEIGHT: ICD-10-CM

## 2025-05-05 DIAGNOSIS — E78.5 HYPERLIPIDEMIA, UNSPECIFIED: ICD-10-CM

## 2025-06-23 ENCOUNTER — RESULT REVIEW (OUTPATIENT)
Age: 50
End: 2025-06-23

## 2025-07-30 ENCOUNTER — OUTPATIENT (OUTPATIENT)
Dept: OUTPATIENT SERVICES | Facility: HOSPITAL | Age: 50
LOS: 1 days | Discharge: HOME | End: 2025-07-30

## 2025-07-30 DIAGNOSIS — N84.0 POLYP OF CORPUS UTERI: Chronic | ICD-10-CM

## 2025-07-30 DIAGNOSIS — Z98.49 CATARACT EXTRACTION STATUS, UNSPECIFIED EYE: Chronic | ICD-10-CM

## 2025-07-30 DIAGNOSIS — C50.912 MALIGNANT NEOPLASM OF UNSPECIFIED SITE OF LEFT FEMALE BREAST: ICD-10-CM

## 2025-07-30 DIAGNOSIS — Z98.890 OTHER SPECIFIED POSTPROCEDURAL STATES: Chronic | ICD-10-CM

## 2025-08-04 ENCOUNTER — APPOINTMENT (OUTPATIENT)
Dept: HEMATOLOGY ONCOLOGY | Facility: CLINIC | Age: 50
End: 2025-08-04
Payer: COMMERCIAL

## 2025-08-04 VITALS
BODY MASS INDEX: 23.68 KG/M2 | RESPIRATION RATE: 16 BRPM | HEIGHT: 62.75 IN | SYSTOLIC BLOOD PRESSURE: 124 MMHG | TEMPERATURE: 98.3 F | HEART RATE: 86 BPM | WEIGHT: 132 LBS | OXYGEN SATURATION: 98 % | DIASTOLIC BLOOD PRESSURE: 86 MMHG

## 2025-08-04 DIAGNOSIS — R23.2 FLUSHING: ICD-10-CM

## 2025-08-04 DIAGNOSIS — T45.1X5A FLUSHING: ICD-10-CM

## 2025-08-04 DIAGNOSIS — Z79.810 LONG TERM (CURRENT) USE OF SELECTIVE ESTROGEN RECEPTOR MODULATORS (SERMS): ICD-10-CM

## 2025-08-04 DIAGNOSIS — C50.912 MALIGNANT NEOPLASM OF UNSPECIFIED SITE OF LEFT FEMALE BREAST: ICD-10-CM

## 2025-08-04 PROCEDURE — G2211 COMPLEX E/M VISIT ADD ON: CPT | Mod: NC

## 2025-08-04 PROCEDURE — 99214 OFFICE O/P EST MOD 30 MIN: CPT

## 2025-09-11 ENCOUNTER — NON-APPOINTMENT (OUTPATIENT)
Age: 50
End: 2025-09-11

## 2025-09-15 ENCOUNTER — NON-APPOINTMENT (OUTPATIENT)
Age: 50
End: 2025-09-15

## 2025-09-16 ENCOUNTER — APPOINTMENT (OUTPATIENT)
Dept: GASTROENTEROLOGY | Facility: CLINIC | Age: 50
End: 2025-09-16
Payer: COMMERCIAL

## 2025-09-16 VITALS
BODY MASS INDEX: 23.04 KG/M2 | HEIGHT: 63 IN | WEIGHT: 130 LBS | OXYGEN SATURATION: 97 % | DIASTOLIC BLOOD PRESSURE: 70 MMHG | SYSTOLIC BLOOD PRESSURE: 118 MMHG | HEART RATE: 86 BPM

## 2025-09-16 PROCEDURE — 99205 OFFICE O/P NEW HI 60 MIN: CPT

## 2025-09-16 RX ORDER — VONOPRAZAN FUMARATE 26.72 MG/1
20 TABLET ORAL
Qty: 30 | Refills: 3 | Status: ACTIVE | COMMUNITY
Start: 2025-09-16 | End: 1900-01-01

## 2025-09-19 ENCOUNTER — TRANSCRIPTION ENCOUNTER (OUTPATIENT)
Age: 50
End: 2025-09-19